# Patient Record
Sex: FEMALE | Race: ASIAN | NOT HISPANIC OR LATINO | URBAN - METROPOLITAN AREA
[De-identification: names, ages, dates, MRNs, and addresses within clinical notes are randomized per-mention and may not be internally consistent; named-entity substitution may affect disease eponyms.]

---

## 2019-07-21 ENCOUNTER — EMERGENCY (EMERGENCY)
Facility: HOSPITAL | Age: 29
LOS: 1 days | Discharge: HOME | End: 2019-07-21
Attending: EMERGENCY MEDICINE
Payer: MEDICAID

## 2019-07-21 VITALS
DIASTOLIC BLOOD PRESSURE: 63 MMHG | HEART RATE: 77 BPM | TEMPERATURE: 98 F | SYSTOLIC BLOOD PRESSURE: 101 MMHG | RESPIRATION RATE: 16 BRPM | OXYGEN SATURATION: 98 %

## 2019-07-21 VITALS
OXYGEN SATURATION: 98 % | DIASTOLIC BLOOD PRESSURE: 77 MMHG | WEIGHT: 123.9 LBS | HEART RATE: 75 BPM | TEMPERATURE: 98 F | HEIGHT: 62 IN | SYSTOLIC BLOOD PRESSURE: 112 MMHG | RESPIRATION RATE: 20 BRPM

## 2019-07-21 LAB
ALBUMIN SERPL ELPH-MCNC: 4.2 G/DL — SIGNIFICANT CHANGE UP (ref 3.3–5)
ALP SERPL-CCNC: 34 U/L — LOW (ref 40–120)
ALT FLD-CCNC: 11 U/L — SIGNIFICANT CHANGE UP (ref 10–45)
ANION GAP SERPL CALC-SCNC: 14 MMOL/L — SIGNIFICANT CHANGE UP (ref 5–17)
APPEARANCE UR: CLEAR — SIGNIFICANT CHANGE UP
APTT BLD: 29.3 SEC — SIGNIFICANT CHANGE UP (ref 27.5–36.3)
AST SERPL-CCNC: 15 U/L — SIGNIFICANT CHANGE UP (ref 10–40)
BACTERIA # UR AUTO: NEGATIVE — SIGNIFICANT CHANGE UP
BASOPHILS # BLD AUTO: 0.1 K/UL — SIGNIFICANT CHANGE UP (ref 0–0.2)
BASOPHILS NFR BLD AUTO: 0.6 % — SIGNIFICANT CHANGE UP (ref 0–2)
BILIRUB SERPL-MCNC: 0.3 MG/DL — SIGNIFICANT CHANGE UP (ref 0.2–1.2)
BILIRUB UR-MCNC: NEGATIVE — SIGNIFICANT CHANGE UP
BLD GP AB SCN SERPL QL: NEGATIVE — SIGNIFICANT CHANGE UP
BUN SERPL-MCNC: 12 MG/DL — SIGNIFICANT CHANGE UP (ref 7–23)
CALCIUM SERPL-MCNC: 9.4 MG/DL — SIGNIFICANT CHANGE UP (ref 8.4–10.5)
CHLORIDE SERPL-SCNC: 101 MMOL/L — SIGNIFICANT CHANGE UP (ref 96–108)
CO2 SERPL-SCNC: 21 MMOL/L — LOW (ref 22–31)
COLOR SPEC: SIGNIFICANT CHANGE UP
CREAT SERPL-MCNC: 0.62 MG/DL — SIGNIFICANT CHANGE UP (ref 0.5–1.3)
DIFF PNL FLD: ABNORMAL
EOSINOPHIL # BLD AUTO: 0.4 K/UL — SIGNIFICANT CHANGE UP (ref 0–0.5)
EOSINOPHIL NFR BLD AUTO: 4.8 % — SIGNIFICANT CHANGE UP (ref 0–6)
EPI CELLS # UR: 1 /HPF — SIGNIFICANT CHANGE UP
GLUCOSE SERPL-MCNC: 94 MG/DL — SIGNIFICANT CHANGE UP (ref 70–99)
GLUCOSE UR QL: NEGATIVE — SIGNIFICANT CHANGE UP
HCG SERPL-ACNC: HIGH MIU/ML
HCT VFR BLD CALC: 34.2 % — LOW (ref 34.5–45)
HGB BLD-MCNC: 12.3 G/DL — SIGNIFICANT CHANGE UP (ref 11.5–15.5)
HYALINE CASTS # UR AUTO: 0 /LPF — SIGNIFICANT CHANGE UP (ref 0–2)
INR BLD: 0.9 RATIO — SIGNIFICANT CHANGE UP (ref 0.88–1.16)
KETONES UR-MCNC: NEGATIVE — SIGNIFICANT CHANGE UP
LEUKOCYTE ESTERASE UR-ACNC: ABNORMAL
LYMPHOCYTES # BLD AUTO: 2.2 K/UL — SIGNIFICANT CHANGE UP (ref 1–3.3)
LYMPHOCYTES # BLD AUTO: 26.4 % — SIGNIFICANT CHANGE UP (ref 13–44)
MCHC RBC-ENTMCNC: 32.8 PG — SIGNIFICANT CHANGE UP (ref 27–34)
MCHC RBC-ENTMCNC: 36.1 GM/DL — HIGH (ref 32–36)
MCV RBC AUTO: 90.7 FL — SIGNIFICANT CHANGE UP (ref 80–100)
MONOCYTES # BLD AUTO: 0.5 K/UL — SIGNIFICANT CHANGE UP (ref 0–0.9)
MONOCYTES NFR BLD AUTO: 6.5 % — SIGNIFICANT CHANGE UP (ref 2–14)
NEUTROPHILS # BLD AUTO: 5 K/UL — SIGNIFICANT CHANGE UP (ref 1.8–7.4)
NEUTROPHILS NFR BLD AUTO: 61.6 % — SIGNIFICANT CHANGE UP (ref 43–77)
NITRITE UR-MCNC: NEGATIVE — SIGNIFICANT CHANGE UP
PH UR: 6.5 — SIGNIFICANT CHANGE UP (ref 5–8)
PLATELET # BLD AUTO: 219 K/UL — SIGNIFICANT CHANGE UP (ref 150–400)
POTASSIUM SERPL-MCNC: 3.3 MMOL/L — LOW (ref 3.5–5.3)
POTASSIUM SERPL-SCNC: 3.3 MMOL/L — LOW (ref 3.5–5.3)
PROT SERPL-MCNC: 7.3 G/DL — SIGNIFICANT CHANGE UP (ref 6–8.3)
PROT UR-MCNC: NEGATIVE — SIGNIFICANT CHANGE UP
PROTHROM AB SERPL-ACNC: 10.3 SEC — SIGNIFICANT CHANGE UP (ref 10–12.9)
RBC # BLD: 3.77 M/UL — LOW (ref 3.8–5.2)
RBC # FLD: 11.4 % — SIGNIFICANT CHANGE UP (ref 10.3–14.5)
RBC CASTS # UR COMP ASSIST: 6 /HPF — HIGH (ref 0–4)
RH IG SCN BLD-IMP: POSITIVE — SIGNIFICANT CHANGE UP
RH IG SCN BLD-IMP: POSITIVE — SIGNIFICANT CHANGE UP
SODIUM SERPL-SCNC: 136 MMOL/L — SIGNIFICANT CHANGE UP (ref 135–145)
SP GR SPEC: 1.01 — SIGNIFICANT CHANGE UP (ref 1.01–1.02)
UROBILINOGEN FLD QL: NEGATIVE — SIGNIFICANT CHANGE UP
WBC # BLD: 8.2 K/UL — SIGNIFICANT CHANGE UP (ref 3.8–10.5)
WBC # FLD AUTO: 8.2 K/UL — SIGNIFICANT CHANGE UP (ref 3.8–10.5)
WBC UR QL: 5 /HPF — SIGNIFICANT CHANGE UP (ref 0–5)

## 2019-07-21 PROCEDURE — 76815 OB US LIMITED FETUS(S): CPT | Mod: 26

## 2019-07-21 PROCEDURE — 85027 COMPLETE CBC AUTOMATED: CPT

## 2019-07-21 PROCEDURE — 81001 URINALYSIS AUTO W/SCOPE: CPT

## 2019-07-21 PROCEDURE — 80053 COMPREHEN METABOLIC PANEL: CPT

## 2019-07-21 PROCEDURE — 86850 RBC ANTIBODY SCREEN: CPT

## 2019-07-21 PROCEDURE — 99284 EMERGENCY DEPT VISIT MOD MDM: CPT

## 2019-07-21 PROCEDURE — 86900 BLOOD TYPING SEROLOGIC ABO: CPT

## 2019-07-21 PROCEDURE — 85730 THROMBOPLASTIN TIME PARTIAL: CPT

## 2019-07-21 PROCEDURE — 84702 CHORIONIC GONADOTROPIN TEST: CPT

## 2019-07-21 PROCEDURE — 76815 OB US LIMITED FETUS(S): CPT

## 2019-07-21 PROCEDURE — 86901 BLOOD TYPING SEROLOGIC RH(D): CPT

## 2019-07-21 PROCEDURE — 85610 PROTHROMBIN TIME: CPT

## 2019-07-21 PROCEDURE — 87086 URINE CULTURE/COLONY COUNT: CPT

## 2019-07-21 RX ORDER — SODIUM CHLORIDE 9 MG/ML
1000 INJECTION INTRAMUSCULAR; INTRAVENOUS; SUBCUTANEOUS ONCE
Refills: 0 | Status: COMPLETED | OUTPATIENT
Start: 2019-07-21 | End: 2019-07-21

## 2019-07-21 RX ADMIN — SODIUM CHLORIDE 1000 MILLILITER(S): 9 INJECTION INTRAMUSCULAR; INTRAVENOUS; SUBCUTANEOUS at 04:13

## 2019-07-21 NOTE — ED PROVIDER NOTE - NSFOLLOWUPINSTRUCTIONS_ED_ALL_ED_FT
You had vaginal bleeding which brought you into the ED. Your labwork was normal and your ultrasound imaging showed a small subchorionic hemorrhage, but a viable pregnancy. You should follow up with your OB/GYN Dr. Mcclain within the next 2-3 days for follow up and a check up. Please return to the ED immediately if you develop worsening pain; or if the bleeding should worsen.

## 2019-07-21 NOTE — ED ADULT NURSE NOTE - CHPI ED NUR SYMPTOMS NEG
no abdominal pain/no coffee grounds emesis/no discharge/no vaginal discharge/no vomiting/no fever/no back pain/no nausea/no pain

## 2019-07-21 NOTE — CONSULT NOTE ADULT - SUBJECTIVE AND OBJECTIVE BOX
R2 GYN ED CONSULT NOTE    HPI:   28yo GP     Pt denies fever, chills, nausea, vomiting, diarrhea, headache, constipation, dizzyness, syncope, chest pain, palpitations, shortness of breath, dysuria, urgency, frequency, abdominal/pelvic pain, vaginal bleeding/discharge/odor/pain/itching, breast lumps/bumps, dyspareunia.    In ED today    Primary OB/GYN:  OBH:  GYNH: denies hx of fibroids, ov cysts, abnl paps, sti  PMSH:  MEDS: none  ALL: nkda  SOCH: denies t/e/d; safe at home  FAMH: denies fam hx of breast/uterine/ovarian/colon cancer  ROS: negative except per hpi    OBJECTIVE:    VITAL SIGNS:  Vital Signs Last 24 Hrs  T(C): 36.7 (2019 01:36), Max: 36.7 (2019 01:36)  T(F): 98.1 (:36), Max: 98.1 (2019 01:36)  HR: 75 (:36) (75 - 75)  BP: 112/77 (2019 01:36) (112/77 - 112/77)  BP(mean): --  RR: 20 (:36) (20 - 20)  SpO2: 98% (:36) (98% - 98%)  Height (cm): 157.48 (19 @ 01:36)  Weight (kg): 56.2 (19 @ 01:36)  BMI (kg/m2): 22.7 (19 @ 01:36)  BSA (m2): 1.56 (19 @ 01:36)  CAPILLARY BLOOD GLUCOSE            PHYSICAL EXAM:  GEN: NAD, A&Ox3  CV: RRR, no m/g/r  LUNGS: CTAB  ABD: soft, NT, ND, +BS  SPECULUM:  PELVIC:  EXT: WWP, no edema/TTP    LABS:                        12.3   8.2   )-----------( 219      ( 2019 03:58 )             34.2   baso 0.6    eos 4.8    imm gran x      lymph 26.4   mono 6.5    poly 61.6           136  |  101  |  12  ----------------------------<  94  3.3<L>   |  21<L>  |  0.62    Ca    9.4      2019 03:58    TPro  7.3  /  Alb  4.2  /  TBili  0.3  /  DBili  x   /  AST  15  /  ALT  11  /  AlkPhos  34<L>  07-21      Urinalysis Basic - ( 2019 04:12 )    Color: Light Yellow / Appearance: Clear / S.010 / pH: x  Gluc: x / Ketone: Negative  / Bili: Negative / Urobili: Negative   Blood: x / Protein: Negative / Nitrite: Negative   Leuk Esterase: Moderate / RBC: 6 /hpf / WBC 5 /HPF   Sq Epi: x / Non Sq Epi: 1 /hpf / Bacteria: Negative        RADIOLOGY:    ASSESSMENT:    RECOMMENDATIONS: R2 GYN ED CONSULT NOTE    HPI:   28yo  @ 10+4wks GA (LMP 19) presents c/o increased bleeding this evening. PNC c/b a cervical polyp diagnosed 19, and patient has been spotting since. At 1am, patient saw blood in the toilet after urination and said she continued to bleed like a period thereafter. Has used one pad since then, not soaked. Has been having mild abdominal cramping for the last few days, including this evening, but currently denies having them. Denies fever, chills, nausea, vomiting, dizziness, syncope, chest pain, palpitations, shortness of breath, dysuria.     In ED today the patient received 1L IVF, no analgesics.     Primary OB/GYN: Dr. Mcclain     OBH: current; c/b cervical polyp  GYNH: normal monthly periods, denies hx of fibroids, ov cysts, abnl paps, sti  PMSH: denies  MEDS: none  ALL: nkda  SOCH: denies t/e/d; safe at home  FAMH: denies fam hx of breast/uterine/ovarian/colon cancer    ROS: negative except per hpi    OBJECTIVE:    VITAL SIGNS:  Vital Signs Last 24 Hrs  T(C): 36.7 (2019 01:36), Max: 36.7 (2019 01:36)  T(F): 98.1 (2019 01:36), Max: 98.1 (2019 01:36)  HR: 75 (2019 01:36) (75 - 75)  BP: 112/77 (2019 01:36) (112/77 - 112/77)  BP(mean): --  RR: 20 (2019 01:36) (20 - 20)  SpO2: 98% (2019 01:36) (98% - 98%)  Height (cm): 157.48 (19 @ 01:36)  Weight (kg): 56.2 (19 @ 01:36)  BMI (kg/m2): 22.7 (19 @ 01:36)  BSA (m2): 1.56 (19 @ 01:36)  CAPILLARY BLOOD GLUCOSE      PHYSICAL EXAM:  GEN: NAD, A&Ox3  CV: RRR, no m/g/r  LUNGS: CTAB  ABD: soft, NT, ND, +BS  SPECULUM: small amount of blood, no active bleeding, cervical polyp visualized  PELVIC: closed cervix, no CMT, firm fundus, no adnexal or uterine tenderness  EXT: no edema/TTP    LABS:                        12.3   8.2   )-----------( 219      ( 2019 03:58 )             34.2   baso 0.6    eos 4.8    imm gran x      lymph 26.4   mono 6.5    poly 61.6           136  |  101  |  12  ----------------------------<  94  3.3<L>   |  21<L>  |  0.62    Ca    9.4      2019 03:58    TPro  7.3  /  Alb  4.2  /  TBili  0.3  /  DBili  x   /  AST  15  /  ALT  11  /  AlkPhos  34<L>        Urinalysis Basic - ( 2019 04:12 )    Color: Light Yellow / Appearance: Clear / S.010 / pH: x  Gluc: x / Ketone: Negative  / Bili: Negative / Urobili: Negative   Blood: x / Protein: Negative / Nitrite: Negative   Leuk Esterase: Moderate / RBC: 6 /hpf / WBC 5 /HPF   Sq Epi: x / Non Sq Epi: 1 /hpf / Bacteria: Negative        RADIOLOGY:  Pending

## 2019-07-21 NOTE — ED PROVIDER NOTE - PHYSICAL EXAMINATION
PHYSICAL EXAM:  GENERAL: NAD, well-groomed, well-developed  HEAD:  Atraumatic, Normocephalic  EYES: EOMI, PERRLA, conjunctiva and sclera clear  ENMT: No tonsillar erythema, exudates, or enlargement; Moist mucous membranes  NECK: Supple, No JVD  HEART: Regular rate and rhythm; No murmurs, rubs, or gallops  RESPIRATORY: CTA B/L, No W/R/R  ABDOMEN: Soft, Nontender, Nondistended, PELVIC: cerivcal os open, 2-3 cm, minimal bleeding, no CMT, no adnexal tenderness, chaperone B Isiah RN  NEURO: A&Ox3, nonfocal, moving all extremities  EXTREMITIES:  2+ Peripheral Pulses, No clubbing, cyanosis, or edema  SKIN: warm, dry, normal color, no rash

## 2019-07-21 NOTE — ED PROVIDER NOTE - ATTENDING CONTRIBUTION TO CARE
pt is a 30 y/o female about 10 weeks pregnant with vag spotting, saw her ob thought to be due to a cervical polyp but bleeding heaving tonight no clots. had some cramping, vss, abd soft, nt, tv us, labs ordered, told prior us with iup, threatened miscarriage work up.

## 2019-07-21 NOTE — ED PROVIDER NOTE - PROGRESS NOTE DETAILS
Stacy PGY2- OB to see in ED, will examine and review vee Aidan PGY-2 - Pt signed out to me. No longer bleeding. Ultrasound looks reassuring. OB saw the patient and stated she is ok to be discharged with follow up.

## 2019-07-21 NOTE — ED PROVIDER NOTE - CLINICAL SUMMARY MEDICAL DECISION MAKING FREE TEXT BOX
See Attending Note See Attending Note,  Stacy PGY2- 1st trimester bleeding, no AC, no sx, G1, has OB f/u,   labs, sono, type, urine, fluids, OB consult

## 2019-07-21 NOTE — CONSULT NOTE ADULT - ASSESSMENT
28yo  @ 10+4wks GA, PNC c/b cervical polyp, presents c/o increased VB. Physical exam with no active bleeding. CBC wnl. Rh+.     -Recs to follow once pelvic sonogram results    Mary Caban PGY-2

## 2019-07-21 NOTE — ED ADULT NURSE REASSESSMENT NOTE - NS ED NURSE REASSESS COMMENT FT1
Received report from nurse Aby Joyce. Pt resting in bed.  No acute respiratory distress noted, v/s obtained.

## 2019-07-21 NOTE — ED ADULT NURSE NOTE - OBJECTIVE STATEMENT
29 YOF A&Ox3 with no pmh presents to ED for vaginal bleeding of 1 day. Patient states she is 10 weeks pregnant (first pregnancy) and noticed some bright red blood when she went to the bathroom earlier. Patient stated had some mild pain earlier but currently has no pain. Patient denies sob, chest pain, n/v/d, headaches & blurry vision. patient appears to be in no acute distress. safety maintained.

## 2019-07-21 NOTE — ED PROVIDER NOTE - OBJECTIVE STATEMENT
29 yoF, no PMHx, G1, 10 wks preg, LNMP early May presents to ED with VB for a few hours this am. Has had VB over past 1 month and told ?cerivical polyp. Told os open at visit on 7/5 with OB Dr. Grier. No AC meds. Mild cramping. No urinary sx. No fever, NVD, CP/SOB, or syncope.

## 2019-07-22 LAB
CULTURE RESULTS: SIGNIFICANT CHANGE UP
SPECIMEN SOURCE: SIGNIFICANT CHANGE UP

## 2019-09-26 ENCOUNTER — TRANSCRIPTION ENCOUNTER (OUTPATIENT)
Age: 29
End: 2019-09-26

## 2019-09-26 ENCOUNTER — INPATIENT (INPATIENT)
Facility: HOSPITAL | Age: 29
LOS: 0 days | Discharge: HOME | DRG: 832 | End: 2019-09-27
Attending: OBSTETRICS & GYNECOLOGY | Admitting: OBSTETRICS & GYNECOLOGY
Payer: MEDICAID

## 2019-09-26 VITALS — WEIGHT: 119.05 LBS | HEIGHT: 62 IN

## 2019-09-26 DIAGNOSIS — O26.899 OTHER SPECIFIED PREGNANCY RELATED CONDITIONS, UNSPECIFIED TRIMESTER: ICD-10-CM

## 2019-09-26 DIAGNOSIS — Z3A.00 WEEKS OF GESTATION OF PREGNANCY NOT SPECIFIED: ICD-10-CM

## 2019-09-26 DIAGNOSIS — Z34.80 ENCOUNTER FOR SUPERVISION OF OTHER NORMAL PREGNANCY, UNSPECIFIED TRIMESTER: ICD-10-CM

## 2019-09-26 PROBLEM — Z00.00 ENCOUNTER FOR PREVENTIVE HEALTH EXAMINATION: Status: ACTIVE | Noted: 2019-09-26

## 2019-09-26 LAB
ALBUMIN SERPL ELPH-MCNC: 4 G/DL — SIGNIFICANT CHANGE UP (ref 3.3–5)
ALP SERPL-CCNC: 43 U/L — SIGNIFICANT CHANGE UP (ref 40–120)
ALT FLD-CCNC: 46 U/L — HIGH (ref 10–45)
ANION GAP SERPL CALC-SCNC: 11 MMOL/L — SIGNIFICANT CHANGE UP (ref 5–17)
APPEARANCE UR: ABNORMAL
APTT BLD: 27.9 SEC — SIGNIFICANT CHANGE UP (ref 27.5–36.3)
AST SERPL-CCNC: 32 U/L — SIGNIFICANT CHANGE UP (ref 10–40)
BACTERIA # UR AUTO: NEGATIVE — SIGNIFICANT CHANGE UP
BILIRUB SERPL-MCNC: 0.2 MG/DL — SIGNIFICANT CHANGE UP (ref 0.2–1.2)
BILIRUB UR-MCNC: NEGATIVE — SIGNIFICANT CHANGE UP
BLD GP AB SCN SERPL QL: NEGATIVE — SIGNIFICANT CHANGE UP
BUN SERPL-MCNC: 12 MG/DL — SIGNIFICANT CHANGE UP (ref 7–23)
CALCIUM SERPL-MCNC: 9.5 MG/DL — SIGNIFICANT CHANGE UP (ref 8.4–10.5)
CHLORIDE SERPL-SCNC: 104 MMOL/L — SIGNIFICANT CHANGE UP (ref 96–108)
CO2 SERPL-SCNC: 21 MMOL/L — LOW (ref 22–31)
COLOR SPEC: YELLOW — SIGNIFICANT CHANGE UP
CREAT SERPL-MCNC: 0.56 MG/DL — SIGNIFICANT CHANGE UP (ref 0.5–1.3)
DIFF PNL FLD: NEGATIVE — SIGNIFICANT CHANGE UP
EPI CELLS # UR: 5 — SIGNIFICANT CHANGE UP
GLUCOSE SERPL-MCNC: 89 MG/DL — SIGNIFICANT CHANGE UP (ref 70–99)
GLUCOSE UR QL: NEGATIVE — SIGNIFICANT CHANGE UP
HCT VFR BLD CALC: 32.5 % — LOW (ref 34.5–45)
HGB BLD-MCNC: 11.4 G/DL — LOW (ref 11.5–15.5)
HYALINE CASTS # UR AUTO: 2 /LPF — SIGNIFICANT CHANGE UP (ref 0–7)
INR BLD: 0.9 RATIO — SIGNIFICANT CHANGE UP (ref 0.88–1.16)
KETONES UR-MCNC: NEGATIVE — SIGNIFICANT CHANGE UP
LEUKOCYTE ESTERASE UR-ACNC: ABNORMAL
MCHC RBC-ENTMCNC: 32.1 PG — SIGNIFICANT CHANGE UP (ref 27–34)
MCHC RBC-ENTMCNC: 35.1 GM/DL — SIGNIFICANT CHANGE UP (ref 32–36)
MCV RBC AUTO: 91.5 FL — SIGNIFICANT CHANGE UP (ref 80–100)
NITRITE UR-MCNC: NEGATIVE — SIGNIFICANT CHANGE UP
PH UR: 6.5 — SIGNIFICANT CHANGE UP (ref 5–8)
PLATELET # BLD AUTO: 231 K/UL — SIGNIFICANT CHANGE UP (ref 150–400)
POTASSIUM SERPL-MCNC: 3.6 MMOL/L — SIGNIFICANT CHANGE UP (ref 3.5–5.3)
POTASSIUM SERPL-SCNC: 3.6 MMOL/L — SIGNIFICANT CHANGE UP (ref 3.5–5.3)
PROT SERPL-MCNC: 7.1 G/DL — SIGNIFICANT CHANGE UP (ref 6–8.3)
PROT UR-MCNC: ABNORMAL
PROTHROM AB SERPL-ACNC: 10.3 SEC — SIGNIFICANT CHANGE UP (ref 10–12.9)
RBC # BLD: 3.55 M/UL — LOW (ref 3.8–5.2)
RBC # FLD: 12.1 % — SIGNIFICANT CHANGE UP (ref 10.3–14.5)
RBC CASTS # UR COMP ASSIST: 7 /HPF — HIGH (ref 0–4)
RH IG SCN BLD-IMP: POSITIVE — SIGNIFICANT CHANGE UP
SODIUM SERPL-SCNC: 136 MMOL/L — SIGNIFICANT CHANGE UP (ref 135–145)
SP GR SPEC: 1.03 — HIGH (ref 1.01–1.02)
UROBILINOGEN FLD QL: NEGATIVE — SIGNIFICANT CHANGE UP
WBC # BLD: 11 K/UL — HIGH (ref 3.8–10.5)
WBC # FLD AUTO: 11 K/UL — HIGH (ref 3.8–10.5)
WBC UR QL: 4 /HPF — SIGNIFICANT CHANGE UP (ref 0–5)

## 2019-09-26 RX ORDER — IBUPROFEN 200 MG
600 TABLET ORAL EVERY 6 HOURS
Refills: 0 | Status: DISCONTINUED | OUTPATIENT
Start: 2019-09-26 | End: 2019-09-27

## 2019-09-26 RX ORDER — LIDOCAINE HCL 20 MG/ML
20 VIAL (ML) INJECTION ONCE
Refills: 0 | Status: DISCONTINUED | OUTPATIENT
Start: 2019-09-26 | End: 2019-09-27

## 2019-09-26 RX ORDER — MIFEPRISTONE 300 MG/1
200 TABLET, FILM COATED ORAL ONCE
Refills: 0 | Status: COMPLETED | OUTPATIENT
Start: 2019-09-26 | End: 2019-09-26

## 2019-09-26 RX ORDER — SODIUM CHLORIDE 9 MG/ML
1000 INJECTION, SOLUTION INTRAVENOUS
Refills: 0 | Status: DISCONTINUED | OUTPATIENT
Start: 2019-09-27 | End: 2019-09-27

## 2019-09-26 RX ORDER — IBUPROFEN 200 MG
1 TABLET ORAL
Qty: 0 | Refills: 0 | DISCHARGE
Start: 2019-09-26

## 2019-09-26 RX ORDER — ACETAMINOPHEN 500 MG
3 TABLET ORAL
Qty: 0 | Refills: 0 | DISCHARGE
Start: 2019-09-26

## 2019-09-26 RX ORDER — ACETAMINOPHEN 500 MG
975 TABLET ORAL EVERY 6 HOURS
Refills: 0 | Status: DISCONTINUED | OUTPATIENT
Start: 2019-09-26 | End: 2019-09-27

## 2019-09-26 RX ORDER — AZITHROMYCIN 500 MG/1
1000 TABLET, FILM COATED ORAL ONCE
Refills: 0 | Status: DISCONTINUED | OUTPATIENT
Start: 2019-09-27 | End: 2019-09-27

## 2019-09-26 RX ORDER — OXYCODONE HYDROCHLORIDE 5 MG/1
5 TABLET ORAL EVERY 4 HOURS
Refills: 0 | Status: DISCONTINUED | OUTPATIENT
Start: 2019-09-26 | End: 2019-09-27

## 2019-09-26 RX ADMIN — MIFEPRISTONE 200 MILLIGRAM(S): 300 TABLET, FILM COATED ORAL at 20:45

## 2019-09-26 NOTE — CHART NOTE - NSCHARTNOTEFT_GEN_A_CORE
Family Planning Consult Note      BILL LEI is a 29y  at 20w1d with confirmed PPROM x 2d. Family planning consulted for termination of pregnancy.    PNC: short cervix, on vaginal progesterone nightly that started 1 month ago. Cervical polyp that caused bleeding during the pregnancy.  PMH: Denies.  PSH: denies  Meds: PNVs  Allergies: Denies.  FHx: Asthma, Diabetes in grandmother.   POb:   Pgyn: regular periods, with intermenstrual bleeding. Denies fibroids, cysts, STDs, abnormal paps.   Social: No T/E/D. 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     PE    D+E Counseling    Options for the pregnancy were discussed with the patient, including continuation of pregnancy, labor induction, and dilation and evacuation (D&E). Given the extremely poor prognosis for fetal outcome and increased risk to maternal health or life from prolonged rupture of membranes, the patient would prefer not to continue the pregnancy. She does not desire a labor induction and is requesting a D&E. Patient aware specimen does not come out intact.    Risks of D&E including infection; risk of hemorrhage, possibly requiring blood transfusion; and risk of injury to vagina, cervix or uterus, possibly requiring laparoscopy, laparotomy or hysterectomy were reviewed in detail. The patient was counseled on the small risk of uterine perforation and risk of injury to rectum, bowel, bladder, ureters, pelvic nerves and blood vessels. The patient was also counseled on the small risk of the need for further surgery and of the risk, as with any surgical procedure, of death.    The risk of harm to subsequent pregnancies or the ability to carry a subsequent child to term, and adverse psychological effects was discussed.    Need for cervical ripening with misoprostol, mifepristone, pre-operative laminaria placement, and administration of intra-amniotic or intra-fetal KCl or digoxin were also discussed; the accompanying risks of infection, bleeding, injury, rupture of membranes, and  labor were reviewed. The risks of delivery of a severely premature infant were reviewed. She understands these risks and agrees to above. The patient [does] does not have any underlying medical conditions that would increase her risks associated with the procedure. [These conditions are] [The additional risks of the procedure are]    The patient also understands it is her responsibility to bring to the attention of her physician any unusual symptoms following the  and to report to follow-up examinations.    [place] regulations were reviewed and since the pregnancy is greater than 20 weeks it is the patient’s responsibility to dispose of the remains following the procedure.    She is sure of her decision and denies any coercion from family, friends or healthcare providers. The patient had the opportunity to ask questions and all questions were answered.      Laminaria Placement    All consents reviewed and confirmed. Patient voiced understanding and confirmed that the risks of this procedure are discomfort, infection,  labor, premature  rupture of membranes, and bleeding. The patient confirms that she understands that once the cervical dilators have been placed, that the  procedure has been started. If she chooses to continue the pregnancy after the placement of the dilators, she understands that there is a risk of  premature rupture of membranes, miscarriage and giving birth to an extremely premature infant. The patient also understands that there is no guarantee that the use of dilators will be successful. She states understanding that mifepristone is considered a pregnancy-ending drug and that dilators and mifepristone are considered pregnancy-ending.    Pre-operative time out performed. Patient’s name, date of birth and procedure were confirmed.    The speculum was placed, [cervical cultures obtained] the cervix was cleansed x3 with [betadyne][hibiclens]. A single tooth tenaculum was placed on the anterior lip of the cervix. A paracervical block was placed in the standard fashion using 20 cc of 1% lidocaine. [Cervix dilated using Christensen dilators]    [ ]#4 laminaria placed    [ ] Dilapan placed    2 Sponges placed    The patient tolerated the procedure well    EBL: minimum          29y  at 20w1d with confirmed PPROM x 2 days scheduled for D+E tomorrow    1. Dilation and Evacuation  -All records and ultrasounds have been reviewed  - Pt does not desire induction of labor  - reviewed patient’s responsibility to contact insurance company for coverage confirmation  -All consents signed today, all questions/concerns addressed  - Patient offered pamphlet for support services [patient accepted][patient declined]  - Patient offered fetal footprints [patient accepted][patient declined]  - Genetics [patient requesting][patient declining][N/A][need to be precertified]  - Reviewed disposal of remains, hospital vs. private burial. Patient desires [routine hospital disposal][private  home burial]    2. Surgery scheduling  - Patient to be precertified for D+E  - D+E scheduled for 7:30a in main operating room    3. ID/Cervical prep  - GC/CT obtained  - rx misoprostol 400 mcg to be placed buccally 3 hours pre op  - pt [desires][declines] HIV/RPR/hepatitis  - Azithromycin dose pending results  - Prescription for Azithromycin [500mg] [1g] given due to [negative][unknown] history of gonorrhea/chlamydia  - Prescription for narcotic [ ] # [ ] with no refills given  - HCS authorization #: ____________  - Reviewed Ibuprofen 600 mg po q 6 hours    4. Labs/Blood type  - to get CBC, T+S, coags at PST’s  - Rhogam pending results    5. Contraception  - Patient counseled on all contraceptive options  - Patient [desires][does not desire] post termination contraception  -[will place IUD at the completion of the procedure]  -[will place Subdermal implant at the completion of the procedure]  -[Patient desires pregnancy]    6. Post-op  - Post-operative follow-up visit to be scheduled in 2 weeks  - Post-operative instruction sheet given, reviewed bleeding and infection precautions  - Provided 24 hour contact phone number  - All questions/concerns addressed Family Planning Consult Note      BILL LEI is a 29y  at 20w1d with confirmed PPROM x 2d. Family planning consulted for termination of pregnancy.    PNC: short cervix, on vaginal progesterone nightly that started 1 month ago. Cervical polyp that caused bleeding during the pregnancy.  PMH: Denies.  PSH: denies  Meds: PNVs  Allergies: Denies.  FHx: Asthma, Diabetes in grandmother.   POb:   Pgyn: regular periods, with intermenstrual bleeding. Denies fibroids, cysts, STDs, abnormal paps.   Social: No T/E/D. 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     PE  General alert, oriented x 3. NAD  SVE     D+E Counseling    Options for the pregnancy were discussed with the patient, including continuation of pregnancy, labor induction, and dilation and evacuation (D&E). Given the extremely poor prognosis for fetal outcome and increased risk to maternal health or life from prolonged rupture of membranes, the patient would prefer not to continue the pregnancy. She does not desire a labor induction and is requesting a D&E. Patient aware specimen does not come out intact.    Risks of D&E including infection; risk of hemorrhage, possibly requiring blood transfusion; and risk of injury to vagina, cervix or uterus, possibly requiring laparoscopy, laparotomy or hysterectomy were reviewed in detail. The patient was counseled on the small risk of uterine perforation and risk of injury to rectum, bowel, bladder, ureters, pelvic nerves and blood vessels. The patient was also counseled on the small risk of the need for further surgery and of the risk, as with any surgical procedure, of death.    The risk of harm to subsequent pregnancies or the ability to carry a subsequent child to term, and adverse psychological effects was discussed.    Need for cervical ripening with misoprostol, mifepristone, pre-operative laminaria placement, and administration of intra-amniotic or intra-fetal KCl or digoxin were also discussed; the accompanying risks of infection, bleeding, injury, rupture of membranes, and  labor were reviewed. The risks of delivery of a severely premature infant were reviewed. She understands these risks and agrees to above. The patient does not have any underlying medical conditions that would increase her risks associated with the procedure.     The patient also understands it is her responsibility to bring to the attention of her physician any unusual symptoms following the  and to report to follow-up examinations.    [place] regulations were reviewed and since the pregnancy is greater than 20 weeks it is the patient’s responsibility to dispose of the remains following the procedure.    She is sure of her decision and denies any coercion from family, friends or healthcare providers. The patient had the opportunity to ask questions and all questions were answered.                          11.4   11.0  )-----------( 231      ( 26 Sep 2019 15:25 )             32.5     PT/INR - ( 26 Sep 2019 15:25 )   PT: 10.3 sec;   INR: 0.90 ratio         PTT - ( 26 Sep 2019 15:25 )  PTT:27.9 sec    Type + Screen (19 @ 15:52)    ABO Interpretation: O    Rh Interpretation: Positive    Antibody Screen: Negative          29y  at 20w1d with confirmed PPROM x 2 days scheduled for D+E tomorrow    1. Dilation and Evacuation  - All records and ultrasounds have been reviewed  - Pt does not desire induction of labor  - reviewed patient’s responsibility to contact insurance company for coverage confirmation  - All consents signed today, all questions/concerns addressed  - Patient offered pamphlet for support services patient accepted  - Patient offered fetal footprints [patient accepted][patient declined]  - Genetics [patient requesting][patient declining][N/A][need to be precertified]  - Reviewed disposal of remains, private burial. Patient understands responsibility to arrange private  home burial    2. Surgery scheduling  - Patient to be precertified for D+E  - D+E scheduled for 7:30a in main operating room    3. ID/Cervical prep  - GC/CT pending  - rx misoprostol 400 mcg to be placed buccally 2 hours pre op  - pt declines HIV/RPR/hepatitis  - Azithromycin dose pending results  - Azithromycin 1g ordered due to unknown history of gonorrhea/chlamydia  - Ibuprofen/Tylenol/Oxycodone for pain    4. Labs/Blood type  - to get CBC, T+S, coags at PST[facility]  - Blood type     5. Contraception  - Patient counseled on all contraceptive options  - Patient [desires][does not desire] post termination contraception  -[will place IUD at the completion of the procedure]  -[will place Subdermal implant at the completion of the procedure]  -[Patient desires pregnancy]    6. Post-op  - Post-operative follow-up visit to be scheduled in 2 weeks  - Post-operative instruction sheet given, reviewed bleeding and infection precautions  - Provided 24 hour contact phone number  - All questions/concerns addressed Family Planning Consult Note    [employer]  #[id number] (Eber    BILL LEI is a 29y  at 20w1d with confirmed PPROM x 2d. Family planning consulted for termination of pregnancy.    PNC: short cervix, on vaginal progesterone nightly that started 1 month ago. Cervical polyp that caused bleeding during the pregnancy.  PMH: Denies.  PSH: denies  Meds: PNVs  Allergies: Denies.  FHx: Asthma, Diabetes in grandmother.   POb:   Pgyn: regular periods, with intermenstrual bleeding. Denies fibroids, cysts, STDs, abnormal paps.   Social: No T/E/D. 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     PE  General alert, oriented x 3. NAD  SVE     D+E Counseling    Options for the pregnancy were discussed with the patient, including continuation of pregnancy, labor induction, and dilation and evacuation (D&E). Given the extremely poor prognosis for fetal outcome and increased risk to maternal health or life from prolonged rupture of membranes, the patient would prefer not to continue the pregnancy. She does not desire a labor induction and is requesting a D&E. Patient aware specimen does not come out intact.    Risks of D&E including infection; risk of hemorrhage, possibly requiring blood transfusion; and risk of injury to vagina, cervix or uterus, possibly requiring laparoscopy, laparotomy or hysterectomy were reviewed in detail. The patient was counseled on the small risk of uterine perforation and risk of injury to rectum, bowel, bladder, ureters, pelvic nerves and blood vessels. The patient was also counseled on the small risk of the need for further surgery and of the risk, as with any surgical procedure, of death.    The risk of harm to subsequent pregnancies or the ability to carry a subsequent child to term, and adverse psychological effects was discussed.    Need for cervical ripening with misoprostol, mifepristone, pre-operative laminaria placement, and administration of intra-amniotic or intra-fetal KCl or digoxin were also discussed; the accompanying risks of infection, bleeding, injury, rupture of membranes, and  labor were reviewed. The risks of delivery of a severely premature infant were reviewed. She understands these risks and agrees to above. The patient does not have any underlying medical conditions that would increase her risks associated with the procedure.     The patient also understands it is her responsibility to bring to the attention of her physician any unusual symptoms following the  and to report to follow-up examinations.    [place] regulations were reviewed and since the pregnancy is greater than 20 weeks it is the patient’s responsibility to dispose of the remains following the procedure.    She is sure of her decision and denies any coercion from family, friends or healthcare providers. The patient had the opportunity to ask questions and all questions were answered.                          11.4   11.0  )-----------( 231      ( 26 Sep 2019 15:25 )             32.5     PT/INR - ( 26 Sep 2019 15:25 )   PT: 10.3 sec;   INR: 0.90 ratio         PTT - ( 26 Sep 2019 15:25 )  PTT:27.9 sec    Type + Screen (19 @ 15:52)    ABO Interpretation: O    Rh Interpretation: Positive    Antibody Screen: Negative          29y  at 20w1d with confirmed PPROM x 2 days scheduled for D+E tomorrow    1. Dilation and Evacuation  - All records and ultrasounds have been reviewed  - Pt does not desire induction of labor  - reviewed patient’s responsibility to contact insurance company for coverage confirmation  - All consents signed today, all questions/concerns addressed  - Patient offered pamphlet for support services patient accepted; social work consult placed.  - Patient offered fetal footprints [patient accepted][patient declined]  - Genetics [patient requesting][patient declining][N/A][need to be precertified]  - Reviewed disposal of remains, private burial. Patient understands responsibility to arrange private  home burial    2. Surgery scheduling  - Patient to be precertified for D+E  - D+E scheduled for 7:30a in main operating room    3. ID/Cervical prep  - GC/CT pending  - Mifepristone given today, Lot#[id number]  - rx misoprostol 400 mcg to be placed buccally 2 hours pre op  - pt declines HIV/RPR/hepatitis  - Azithromycin dose pending results  - Azithromycin 1g ordered due to unknown history of gonorrhea/chlamydia  - Ibuprofen/Tylenol/Oxycodone for pain    4. Labs/Blood type  - to get CBC, T+S, coags at PST[facility]  - Blood type     5. Contraception  - Patient counseled on all contraceptive options  - Patient [desires][does not desire] post termination contraception  -[will place IUD at the completion of the procedure]  -[will place Subdermal implant at the completion of the procedure]  -[Patient desires pregnancy]    6. Post-op  - Post-operative follow-up visit to be scheduled in 2 weeks  - Post-operative instruction sheet given, reviewed bleeding and infection precautions  - Provided 24 hour contact phone number  - All questions/concerns addressed Family Planning Consult Note    [employer]  #[id number] (Gm)    BILL LEI is a 29y  at 20w1d with confirmed PPROM x 2d. Family planning consulted for termination of pregnancy.    PNC: short cervix, on vaginal progesterone nightly that started 1 month ago. Cervical polyp that caused bleeding during the pregnancy.  PMH: Denies.  PSH: denies  Meds: PNVs  Allergies: Denies.  FHx: Asthma, Diabetes in grandmother.   POb:   Pgyn: regular periods, with intermenstrual bleeding. Denies fibroids, cysts, STDs, abnormal paps.   Social: No T/E/D. 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     PE  General alert, oriented x 3. NAD  SVE /-3    D+E Counseling    Options for the pregnancy were discussed with the patient, including continuation of pregnancy, labor induction, and dilation and evacuation (D&E). Given the extremely poor prognosis for fetal outcome and increased risk to maternal health or life from prolonged rupture of membranes, the patient would prefer not to continue the pregnancy. She does not desire a labor induction and is requesting a D&E. Patient aware specimen does not come out intact.    Risks of D&E including infection; risk of hemorrhage, possibly requiring blood transfusion; and risk of injury to vagina, cervix or uterus, possibly requiring laparoscopy, laparotomy or hysterectomy were reviewed in detail. The patient was counseled on the small risk of uterine perforation and risk of injury to rectum, bowel, bladder, ureters, pelvic nerves and blood vessels. The patient was also counseled on the small risk of the need for further surgery and of the risk, as with any surgical procedure, of death.    The risk of harm to subsequent pregnancies or the ability to carry a subsequent child to term, and adverse psychological effects was discussed.    Need for cervical ripening with misoprostol, mifepristone, pre-operative laminaria placement, and administration of intra-amniotic or intra-fetal KCl or digoxin were also discussed; the accompanying risks of infection, bleeding, injury, rupture of membranes, and  labor were reviewed. The risks of delivery of a severely premature infant were reviewed. She understands these risks and agrees to above. The patient does not have any underlying medical conditions that would increase her risks associated with the procedure.     The patient also understands it is her responsibility to bring to the attention of her physician any unusual symptoms following the  and to report to follow-up examinations.    [place] regulations were reviewed and since the pregnancy is greater than 20 weeks it is the patient’s responsibility to dispose of the remains following the procedure.    She is sure of her decision and denies any coercion from family, friends or healthcare providers. The patient had the opportunity to ask questions and all questions were answered.                          11.4   11.0  )-----------( 231      ( 26 Sep 2019 15:25 )             32.5     PT/INR - ( 26 Sep 2019 15:25 )   PT: 10.3 sec;   INR: 0.90 ratio         PTT - ( 26 Sep 2019 15:25 )  PTT:27.9 sec    Type + Screen (19 @ 15:52)    ABO Interpretation: O    Rh Interpretation: Positive    Antibody Screen: Negative          29y  at 20w1d with confirmed PPROM x 2 days scheduled for D+E tomorrow    1. Dilation and Evacuation  - All records and ultrasounds have been reviewed  - Pt does not desire induction of labor  - reviewed patient’s responsibility to contact insurance company for coverage confirmation  - All consents signed today, all questions/concerns addressed  - Patient offered pamphlet for support services patient accepted; social work consult placed.  - Patient offered fetal footprints patient accepted  - Genetics: options, benefits, alternative, process reviewed. Patient will decide in AM  - Autopsy: patient declined full autopsy, elected for gross pathology only  - Reviewed disposal of remains, private burial. Patient understands responsibility to arrange private  home burial    2. Surgery scheduling  - Patient to be precertified for D+E  - D+E scheduled for 7:30a in main operating room    3. ID/Cervical prep  - GC/CT pending  - Mifepristone given today, Lot#[id number]  - rx misoprostol 400 mcg to be placed buccally 2h hours pre op  - pt declines HIV/RPR/hepatitis  - Azithromycin 1g ordered due to unknown history of gonorrhea/chlamydia  - Ibuprofen/Tylenol/Oxycodone for pain    4. Labs/Blood type  - CBC, T+S, Coags reviewed. No gross abnormalities  - Blood type O+, not a rhogam candidate    5. Contraception  - Patient counseled on all contraceptive options  - Patient does not desire post termination contraception    6. Post-op  - Post-operative follow-up visit to be scheduled in 2 weeks  - Post-operative instruction sheet given, reviewed bleeding and infection precautions  - Provided 24 hour contact phone number  - All questions/concerns addressed    Patient seen, evaluated, and discussed with Dr. Jael Mora PGY2 Family Planning Consult Note    [employer]  #[id number] (Gm)    BILL LEI is a 29y  at 20w1d with confirmed PPROM x 2d. Family planning consulted for termination of pregnancy.    PNC: short cervix, on vaginal progesterone nightly that started 1 month ago. Cervical polyp that caused bleeding during the pregnancy.  PMH: Denies.  PSH: denies  Meds: PNVs  Allergies: Denies.  FHx: Asthma, Diabetes in grandmother.   POb:   Pgyn: regular periods, with intermenstrual bleeding. Denies fibroids, cysts, STDs, abnormal paps.   Social: No T/E/D. 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     PE  General alert, oriented x 3. NAD  SVE /-3, soft (performed by Dr. Elder)    D+E Counseling    Options for the pregnancy were discussed with the patient, including continuation of pregnancy, labor induction, and dilation and evacuation (D&E). Given the extremely poor prognosis for fetal outcome and increased risk to maternal health or life from prolonged rupture of membranes, the patient would prefer not to continue the pregnancy. She does not desire a labor induction and is requesting a D&E. Patient aware specimen does not come out intact.    Risks of D&E including infection; risk of hemorrhage, possibly requiring blood transfusion; and risk of injury to vagina, cervix or uterus, possibly requiring laparoscopy, laparotomy or hysterectomy were reviewed in detail. The patient was counseled on the small risk of uterine perforation and risk of injury to rectum, bowel, bladder, ureters, pelvic nerves and blood vessels. The patient was also counseled on the small risk of the need for further surgery and of the risk, as with any surgical procedure, of death.    The risk of harm to subsequent pregnancies or the ability to carry a subsequent child to term, and adverse psychological effects was discussed.    Need for cervical ripening with misoprostol, mifepristone, pre-operative laminaria placement, and administration of intra-amniotic or intra-fetal KCl or digoxin were also discussed; the accompanying risks of infection, bleeding, injury, rupture of membranes, and  labor were reviewed. The risks of delivery of a severely premature infant were reviewed. She understands these risks and agrees to above. The patient does not have any underlying medical conditions that would increase her risks associated with the procedure.     The patient also understands it is her responsibility to bring to the attention of her physician any unusual symptoms following the  and to report to follow-up examinations.    [place] regulations were reviewed and since the pregnancy is greater than 20 weeks it is the patient’s responsibility to dispose of the remains following the procedure.    She is sure of her decision and denies any coercion from family, friends or healthcare providers. The patient had the opportunity to ask questions and all questions were answered.                          11.4   11.0  )-----------( 89      ( 26 Sep 2019 15:25 )             32.5     PT/INR - ( 26 Sep 2019 15:25 )   PT: 10.3 sec;   INR: 0.90 ratio         PTT - ( 26 Sep 2019 15:25 )  PTT:27.9 sec    Type + Screen (19 @ 15:52)    ABO Interpretation: O    Rh Interpretation: Positive    Antibody Screen: Negative          29y  at 20w1d with confirmed PPROM x 2 days scheduled for D+E tomorrow    1. Dilation and Evacuation  - All records and ultrasounds have been reviewed  - Pt does not desire induction of labor  - reviewed patient’s responsibility to contact insurance company for coverage confirmation  - All consents signed today, all questions/concerns addressed  - Patient offered pamphlet for support services patient accepted; social work consult placed.  - Patient offered fetal footprints patient accepted  - Genetics: options, benefits, alternative, process reviewed. Patient will decide in AM  - Autopsy: patient declined full autopsy, elected for gross pathology only  - Reviewed disposal of remains, private burial. Patient understands responsibility to arrange private  home burial    2. Surgery scheduling  - Patient to be precertified for D+E  - D+E scheduled for 7:30a in main operating room    3. ID/Cervical prep  - GC/CT pending  - Mifepristone given today, Lot#[id number]  - rx misoprostol 400 mcg to be placed buccally 2h hours pre op  - pt declines HIV/RPR/hepatitis  - Azithromycin 1g ordered due to unknown history of gonorrhea/chlamydia  - Ibuprofen/Tylenol/Oxycodone for pain    4. Labs/Blood type  - CBC, T+S, Coags reviewed. No gross abnormalities  - Blood type O+, not a rhogam candidate    5. Contraception  - Patient counseled on all contraceptive options  - Patient does not desire post termination contraception    6. Post-op  - Post-operative follow-up visit to be scheduled in 2 weeks  - Post-operative instruction sheet given, reviewed bleeding and infection precautions  - Provided 24 hour contact phone number  - All questions/concerns addressed    Patient seen, evaluated, and discussed with Dr. Jael Mora PGY2

## 2019-09-26 NOTE — CHART NOTE - NSCHARTNOTEFT_GEN_A_CORE
ANTEPARTUM H&P (copied from [facility])    30 yo  at 20w1d c/o intermittent LOF that started acutely 2 days prior to presentation. It is exacerbated by coughing, bearing down, and standing up. She states that it is yellow in color with no blood, and she has used about 5 pads. She states the color may be due to the vaginal progesterone, but that she has been using it for a month previously without these symptoms. She endorses increased urinary frequency that also started 2 days ago. Denies dysuria, cramping, contractions, vaginal bleeding. +FM.     PNC: short cervix, on vaginal progesterone nightly that started 1 month ago. Cervical polyp that caused bleeding during the pregnancy.  PMH: Denies.  PSH: denies  Meds: PNVs  Allergies: Denies.  FHx: Asthma, Diabetes in grandmother.   POb:   Pgyn: regular periods, with intermenstrual bleeding. Denies fibroids, cysts, STDs, abnormal paps.   Social: No T/E/D. 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     Physical Exam: (performed by Dr. Chacon)  /68 HR 73 T 37  Gen: AOx3 in NAD.  Abd: Soft, nontender.   SSE: Small pooling noted in the posterior fornix. Greenish-yellow discharge present throughout. No erythema or lesions noted. +nitrazine, + ferning.   SVE: /50/-3   Ext: warm and well perfused, no cyanosis, clubbing, or edema  TAUS: Breech, Posterior Placenta, MVP 3.2     A/P:   30 yo  at 20w1d presenting with previable PPROM. VSS, no signs or symptoms of infection   - Discussed IOL vs. D&E vs. expectant management. Pt and partner decided D&E  -Consult placed with Dr. Elder of Family Planning.  -Admit for D&E. Scheduled  7:30AM in the main OR  - F/u UA, Ucx  - CBC, CMP, coags, T&Sx2  -GC/CT ()  - NPO at midnight, LR@125  - AM labs    D/w Dr. Prince AICHA Chacon PGY3

## 2019-09-27 ENCOUNTER — APPOINTMENT (OUTPATIENT)
Dept: OBGYN | Facility: CLINIC | Age: 29
End: 2019-09-27

## 2019-09-27 ENCOUNTER — RESULT REVIEW (OUTPATIENT)
Age: 29
End: 2019-09-27

## 2019-09-27 VITALS
HEART RATE: 78 BPM | OXYGEN SATURATION: 99 % | DIASTOLIC BLOOD PRESSURE: 74 MMHG | SYSTOLIC BLOOD PRESSURE: 103 MMHG | RESPIRATION RATE: 18 BRPM | TEMPERATURE: 98 F

## 2019-09-27 LAB
C TRACH RRNA SPEC QL NAA+PROBE: SIGNIFICANT CHANGE UP
CULTURE RESULTS: SIGNIFICANT CHANGE UP
N GONORRHOEA RRNA SPEC QL NAA+PROBE: SIGNIFICANT CHANGE UP
SPECIMEN SOURCE: SIGNIFICANT CHANGE UP
SPECIMEN SOURCE: SIGNIFICANT CHANGE UP

## 2019-09-27 PROCEDURE — 88305 TISSUE EXAM BY PATHOLOGIST: CPT | Mod: 26

## 2019-09-27 PROCEDURE — 59841 INDUCED ABORTION DILAT&EVAC: CPT

## 2019-09-27 PROCEDURE — 88342 IMHCHEM/IMCYTCHM 1ST ANTB: CPT | Mod: 26

## 2019-09-27 PROCEDURE — 76998 US GUIDE INTRAOP: CPT | Mod: 26

## 2019-09-27 RX ORDER — ONDANSETRON 8 MG/1
4 TABLET, FILM COATED ORAL ONCE
Refills: 0 | Status: DISCONTINUED | OUTPATIENT
Start: 2019-09-27 | End: 2019-09-27

## 2019-09-27 RX ORDER — HYDROMORPHONE HYDROCHLORIDE 2 MG/ML
1 INJECTION INTRAMUSCULAR; INTRAVENOUS; SUBCUTANEOUS
Refills: 0 | Status: DISCONTINUED | OUTPATIENT
Start: 2019-09-27 | End: 2019-09-27

## 2019-09-27 RX ORDER — FENTANYL CITRATE 50 UG/ML
25 INJECTION INTRAVENOUS
Refills: 0 | Status: DISCONTINUED | OUTPATIENT
Start: 2019-09-27 | End: 2019-09-27

## 2019-09-27 RX ORDER — SODIUM CHLORIDE 9 MG/ML
1000 INJECTION, SOLUTION INTRAVENOUS
Refills: 0 | Status: DISCONTINUED | OUTPATIENT
Start: 2019-09-27 | End: 2019-09-27

## 2019-09-27 RX ADMIN — Medication 0.2 MILLIGRAM(S): at 10:34

## 2019-09-27 RX ADMIN — SODIUM CHLORIDE 125 MILLILITER(S): 9 INJECTION, SOLUTION INTRAVENOUS at 09:50

## 2019-09-27 NOTE — ASU DISCHARGE PLAN (ADULT/PEDIATRIC) - NURSING INSTRUCTIONS
If at any time there are any signs of infection (increased swelling, redness, drainage from the incisions, warmth, fever, chills) , severe pain unrelieved by prescribed medications and if you have any questions or concerns , contact your Doctor.

## 2019-09-27 NOTE — PRE-ANESTHESIA EVALUATION ADULT - NSANTHPEFT_GEN_ALL_CORE
PHYSICAL EXAM:  GENERAL: NAD, well-developed  HEAD:  Atraumatic, Normocephalic  HEART: Regular rate and rhythm  PSYCH: AAOx3  NEUROLOGY: non-focal  SKIN: No rashes or lesions

## 2019-09-27 NOTE — ASU DISCHARGE PLAN (ADULT/PEDIATRIC) - CARE PROVIDER_API CALL
Alcides Ramirez)  Obstetrics and Gynecology  [address] [facility], [place] [address]  Phone: [phone]  Fax: [phone]  Follow Up Time:

## 2019-09-27 NOTE — ASU DISCHARGE PLAN (ADULT/PEDIATRIC) - ACTIVITY LEVEL
No intercourse/2 weeks/No weight bearing/No heavy lifting/No sports/gym/Nothing per vagina/No tampons/No tub baths/No excercise/No douching

## 2019-09-27 NOTE — PROGRESS NOTE ADULT - SUBJECTIVE AND OBJECTIVE BOX
POST-OP CHECK    Allergies    No Known Allergies    Intolerances        S: Pt awake and alert resting comfortaby in bed   Pain controlled. Pt denies N/V, SOB, CP, palpitations.    O:   T(C): 36.4 (09-27-19 @ 09:08), Max: 36.4 (09-27-19 @ 09:08)  HR: 75 (09-27-19 @ 11:00) (72 - 81)  BP: 105/65 (09-27-19 @ 11:00) (101/56 - 112/61)  RR: 17 (09-27-19 @ 11:00) (17 - 22)  SpO2: 96% (09-27-19 @ 11:00) (96% - 100%)  Wt(kg): --  I&O's Summary    27 Sep 2019 07:01  -  27 Sep 2019 11:55  --------------------------------------------------------  IN: 380 mL / OUT: 350 mL / NET: 30 mL        Gen: A&Ox3  CV: S1S2, RRR  Lungs: CTA B/L  Abd: soft, appropriately tender, occassional BS x 4 quadrants  Inc: Clean/dry/intact  : fragoso d/c'ed, pt voided 3x with pale yellow urine  Perineum: minimal vaginal bleeding noted, fundus is firm  Ext: PAS in place and functioning, Neg Homans B/L. no swelling, redness noted    A/P: 29y Female S/P D+E  with  No significant past medical history      -Continue routine care  -Analgesia PRN  -OOB with assistance x 2, then Ad Martina  -Meds:   azithromycin  IVPB 1000 milliGRAM(s) IV Intermittent once  fentaNYL    Injectable 25 MICROGram(s) IV Push every 5 minutes PRN  HYDROmorphone  Injectable 1 milliGRAM(s) IV Push every 10 minutes PRN  lactated ringers. 1000 milliLiter(s) IV Continuous <Continuous>  ondansetron Injectable 4 milliGRAM(s) IV Push once PRN        -CV: hemodynically stable  -Resp: no active issues, Incentive spirometer at bedside  -GI:   reg diet   - : patient is voiding adequately  -DVT PPX: early ambulation   Pain Management: tylenol, motrin  -F/E/N: LR 125mL/hr  -Dispo: to home when discharge criteria are met    Lissette Sarabia NP

## 2019-10-14 ENCOUNTER — APPOINTMENT (OUTPATIENT)
Dept: OBGYN | Facility: CLINIC | Age: 29
End: 2019-10-14
Payer: MEDICAID

## 2019-10-14 ENCOUNTER — OUTPATIENT (OUTPATIENT)
Dept: OUTPATIENT SERVICES | Facility: HOSPITAL | Age: 29
LOS: 1 days | End: 2019-10-14
Payer: MEDICAID

## 2019-10-14 VITALS
SYSTOLIC BLOOD PRESSURE: 120 MMHG | HEIGHT: 62 IN | WEIGHT: 126 LBS | DIASTOLIC BLOOD PRESSURE: 80 MMHG | BODY MASS INDEX: 23.19 KG/M2

## 2019-10-14 DIAGNOSIS — N76.0 ACUTE VAGINITIS: ICD-10-CM

## 2019-10-14 PROCEDURE — G0463: CPT

## 2019-10-14 PROCEDURE — 99024 POSTOP FOLLOW-UP VISIT: CPT

## 2019-10-16 NOTE — END OF VISIT
[] : Resident [FreeTextEntry3] : reviewed plan with pt and her partner, all questions/concerns addressed

## 2019-10-16 NOTE — HISTORY OF PRESENT ILLNESS
[0/10] : no pain reported [Normal] : the vagina was normal [None] : no vaginal bleeding [Doing Well] : is doing well [Fever] : no fever [Chills] : no chills [Nausea] : no nausea [Vomiting] : no vomiting [Diarrhea] : no diarrhea [Vaginal Bleeding] : no vaginal bleeding [Pelvic Pressure] : no pelvic pressure [Dysuria] : no dysuria [Vaginal Discharge] : no vaginal discharge [Constipation] : no constipation [Hematoma] : no vaginal hematoma [Abscess Formation] : no vaginal abscess [de-identified] : speculum exam revealed nl external genitalia, vagina wnl no lesions, cervix wnl no lesions, no bleeding noted, normal clear vaginal discharge noted, non tender, no CMT

## 2019-10-16 NOTE — CHIEF COMPLAINT
[Procedure: ___] : status post [unfilled] [Post Op Day: ___] : post op day #[unfilled] [Spouse] : spouse [de-identified] : 9/27/19

## 2019-10-21 DIAGNOSIS — Z48.89 ENCOUNTER FOR OTHER SPECIFIED SURGICAL AFTERCARE: ICD-10-CM

## 2019-10-23 ENCOUNTER — RESULT REVIEW (OUTPATIENT)
Age: 29
End: 2019-10-23

## 2019-10-23 LAB — SURGICAL PATHOLOGY STUDY: SIGNIFICANT CHANGE UP

## 2019-10-31 PROCEDURE — 85027 COMPLETE CBC AUTOMATED: CPT

## 2019-10-31 PROCEDURE — 80053 COMPREHEN METABOLIC PANEL: CPT

## 2019-10-31 PROCEDURE — 85610 PROTHROMBIN TIME: CPT

## 2019-10-31 PROCEDURE — G0463: CPT

## 2019-10-31 PROCEDURE — 87491 CHLMYD TRACH DNA AMP PROBE: CPT

## 2019-10-31 PROCEDURE — 87086 URINE CULTURE/COLONY COUNT: CPT

## 2019-10-31 PROCEDURE — 81001 URINALYSIS AUTO W/SCOPE: CPT

## 2019-10-31 PROCEDURE — 87591 N.GONORRHOEAE DNA AMP PROB: CPT

## 2019-10-31 PROCEDURE — 85730 THROMBOPLASTIN TIME PARTIAL: CPT

## 2019-10-31 PROCEDURE — 86850 RBC ANTIBODY SCREEN: CPT

## 2019-10-31 PROCEDURE — 86900 BLOOD TYPING SEROLOGIC ABO: CPT

## 2019-10-31 PROCEDURE — 86901 BLOOD TYPING SEROLOGIC RH(D): CPT

## 2019-10-31 PROCEDURE — 88305 TISSUE EXAM BY PATHOLOGIST: CPT

## 2019-10-31 PROCEDURE — C1889: CPT

## 2019-10-31 PROCEDURE — 88342 IMHCHEM/IMCYTCHM 1ST ANTB: CPT

## 2022-04-20 NOTE — CONSULT NOTE ADULT - CONSULT REQUESTED DATE/TIME
Detail Level: Zone
Other (Free Text): Use Panoxyl and Head and Shoulders for a bit. bbw
21-Jul-2019 04:15

## 2023-04-06 ENCOUNTER — TRANSCRIPTION ENCOUNTER (OUTPATIENT)
Age: 33
End: 2023-04-06

## 2023-04-07 ENCOUNTER — OUTPATIENT (OUTPATIENT)
Dept: OUTPATIENT SERVICES | Facility: HOSPITAL | Age: 33
LOS: 1 days | End: 2023-04-07
Payer: MEDICAID

## 2023-04-07 ENCOUNTER — TRANSCRIPTION ENCOUNTER (OUTPATIENT)
Age: 33
End: 2023-04-07

## 2023-04-07 VITALS
DIASTOLIC BLOOD PRESSURE: 58 MMHG | RESPIRATION RATE: 18 BRPM | HEART RATE: 66 BPM | TEMPERATURE: 98 F | SYSTOLIC BLOOD PRESSURE: 104 MMHG

## 2023-04-07 VITALS
DIASTOLIC BLOOD PRESSURE: 55 MMHG | SYSTOLIC BLOOD PRESSURE: 116 MMHG | RESPIRATION RATE: 18 BRPM | TEMPERATURE: 99 F | HEART RATE: 76 BPM | OXYGEN SATURATION: 100 %

## 2023-04-07 DIAGNOSIS — Z98.890 OTHER SPECIFIED POSTPROCEDURAL STATES: Chronic | ICD-10-CM

## 2023-04-07 DIAGNOSIS — O34.31 MATERNAL CARE FOR CERVICAL INCOMPETENCE, FIRST TRIMESTER: ICD-10-CM

## 2023-04-07 DIAGNOSIS — N84.1 POLYP OF CERVIX UTERI: ICD-10-CM

## 2023-04-07 LAB
BLD GP AB SCN SERPL QL: NEGATIVE — SIGNIFICANT CHANGE UP
HCT VFR BLD CALC: 33 % — LOW (ref 34.5–45)
HGB BLD-MCNC: 11.3 G/DL — LOW (ref 11.5–15.5)
MCHC RBC-ENTMCNC: 30.9 PG — SIGNIFICANT CHANGE UP (ref 27–34)
MCHC RBC-ENTMCNC: 34.2 GM/DL — SIGNIFICANT CHANGE UP (ref 32–36)
MCV RBC AUTO: 90.2 FL — SIGNIFICANT CHANGE UP (ref 80–100)
NRBC # BLD: 0 /100 WBCS — SIGNIFICANT CHANGE UP (ref 0–0)
PLATELET # BLD AUTO: 214 K/UL — SIGNIFICANT CHANGE UP (ref 150–400)
RBC # BLD: 3.66 M/UL — LOW (ref 3.8–5.2)
RBC # FLD: 12.7 % — SIGNIFICANT CHANGE UP (ref 10.3–14.5)
RH IG SCN BLD-IMP: POSITIVE — SIGNIFICANT CHANGE UP
WBC # BLD: 5.26 K/UL — SIGNIFICANT CHANGE UP (ref 3.8–10.5)
WBC # FLD AUTO: 5.26 K/UL — SIGNIFICANT CHANGE UP (ref 3.8–10.5)

## 2023-04-07 PROCEDURE — 59320 REVISION OF CERVIX: CPT

## 2023-04-07 PROCEDURE — 86900 BLOOD TYPING SEROLOGIC ABO: CPT

## 2023-04-07 PROCEDURE — 36415 COLL VENOUS BLD VENIPUNCTURE: CPT

## 2023-04-07 PROCEDURE — 86901 BLOOD TYPING SEROLOGIC RH(D): CPT

## 2023-04-07 PROCEDURE — 86850 RBC ANTIBODY SCREEN: CPT

## 2023-04-07 PROCEDURE — 85027 COMPLETE CBC AUTOMATED: CPT

## 2023-04-07 RX ORDER — SODIUM CHLORIDE 9 MG/ML
1000 INJECTION, SOLUTION INTRAVENOUS
Refills: 0 | Status: DISCONTINUED | OUTPATIENT
Start: 2023-04-07 | End: 2023-04-21

## 2023-04-07 RX ORDER — ACETAMINOPHEN 500 MG
750 TABLET ORAL ONCE
Refills: 0 | Status: COMPLETED | OUTPATIENT
Start: 2023-04-07 | End: 2023-04-07

## 2023-04-07 RX ORDER — CITRIC ACID/SODIUM CITRATE 300-500 MG
15 SOLUTION, ORAL ORAL ONCE
Refills: 0 | Status: COMPLETED | OUTPATIENT
Start: 2023-04-07 | End: 2023-04-07

## 2023-04-07 RX ADMIN — Medication 750 MILLIGRAM(S): at 17:19

## 2023-04-07 RX ADMIN — Medication 300 MILLIGRAM(S): at 16:16

## 2023-04-07 RX ADMIN — SODIUM CHLORIDE 125 MILLILITER(S): 9 INJECTION, SOLUTION INTRAVENOUS at 12:10

## 2023-04-07 RX ADMIN — SODIUM CHLORIDE 125 MILLILITER(S): 9 INJECTION, SOLUTION INTRAVENOUS at 17:30

## 2023-04-07 RX ADMIN — Medication 15 MILLILITER(S): at 13:07

## 2023-04-07 NOTE — DISCHARGE NOTE ANTEPARTUM - NS MD DC FALL RISK RISK
For information on Fall & Injury Prevention, visit: [email or web address] OR  [email or web address] OR  https://www.cdc.gov/steadi/patient.html

## 2023-04-07 NOTE — OB PROVIDER H&P - NSHPPHYSICALEXAM_GEN_ALL_CORE
Vital Signs Last 24 Hrs  T(C): 36.5 (07 Apr 2023 12:28), Max: 36.5 (07 Apr 2023 12:28)  T(F): 97.7 (07 Apr 2023 12:28), Max: 97.7 (07 Apr 2023 12:28)  HR: 79 (07 Apr 2023 12:48) (66 - 91)  BP: 104/58 (07 Apr 2023 12:28) (104/58 - 104/58)  BP(mean): --  RR: 18 (07 Apr 2023 12:28) (18 - 18)  SpO2: 100% (07 Apr 2023 12:48) (95% - 100%)    Parameters below as of 07 Apr 2023 12:28  Patient On (Oxygen Delivery Method): room air    General: well appearing, NAD   Pulmonary: breathing comfortably on room air  Cardiovascular: extremities well perfused   TAUS: +    VE: deferred

## 2023-04-07 NOTE — BRIEF OPERATIVE NOTE - NSICDXBRIEFPREOP_GEN_ALL_CORE_FT
PRE-OP DIAGNOSIS:  History of  premature rupture of membranes (PPROM) 2023 14:28:21  Fareed Arriaga

## 2023-04-07 NOTE — BRIEF OPERATIVE NOTE - NSICDXBRIEFPOSTOP_GEN_ALL_CORE_FT
POST-OP DIAGNOSIS:  History of  premature rupture of membranes (PPROM) 2023 14:28:33  Fareed Arriaga

## 2023-04-07 NOTE — OB RN PATIENT PROFILE - NS_SUPPORTPERSONNAME_OBGYN_ALL_OB_FT
Yoan [facility] [phone] Yoan [facility] [phone] Yoan Galvan

## 2023-04-07 NOTE — DISCHARGE NOTE ANTEPARTUM - CARE PROVIDER_API CALL
Alcides Ramirez)  Obstetrics and Gynecology  [address] [facility], [address] [place] [address]  Phone: [phone]  Fax: [phone]  Follow Up Time: 1 week

## 2023-04-07 NOTE — DISCHARGE NOTE ANTEPARTUM - MEDICATION SUMMARY - MEDICATIONS TO STOP TAKING
I will STOP taking the medications listed below when I get home from the hospital:    ibuprofen 600 mg oral tablet  -- 1 tab(s) by mouth every 6 hours, As needed, Mild Pain (1 - 3)

## 2023-04-07 NOTE — OB RN INTRAOPERATIVE NOTE - NSOBSELHIDDEN_OBGYN_ALL_OB_FT
[NSOBAttendingProcedure1_OBGYN_ALL_OB_FT:[id number]=],[NSRNCirculatorProcedure1_OBGYN_ALL_OB_FT:[id number]==]

## 2023-04-07 NOTE — DISCHARGE NOTE ANTEPARTUM - PATIENT PORTAL LINK FT
You can access the FollowMyHealth Patient Portal offered by [facility] by registering at the following website: [email or web address]. By joining [facility]’s FollowMyHealth portal, you will also be able to view your health information using other applications (apps) compatible with our system.

## 2023-04-07 NOTE — OB PROVIDER H&P - NSLOWPPHRISK_OBGYN_A_OB
No previous uterine incision/Sanchez Pregnancy/Less than or equal to 4 previous vaginal births/No known bleeding disorder/No history of postpartum hemorrhage/No other PPH risks indicated

## 2023-04-07 NOTE — OB PROVIDER H&P - NS_OBGYNHISTORY_OBGYN_ALL_OB_FT
OBHx:   - 09/27/2019 D&E @20w due to PPROM   GynHx: + polyp, denies any fibroids, abnl pap smears, STIs

## 2023-04-07 NOTE — DISCHARGE NOTE ANTEPARTUM - PLAN OF CARE
Patient had scheduled cerclage placement with no complications, EBL 5. Patient was able to void, tolerate po, pain well managed, ambulating.

## 2023-04-07 NOTE — DISCHARGE NOTE ANTEPARTUM - MEDICATION SUMMARY - MEDICATIONS TO TAKE
I will START or STAY ON the medications listed below when I get home from the hospital:    acetaminophen 325 mg oral tablet  -- 3 tab(s) by mouth every 6 hours, As needed, Moderate Pain (4 - 6)  -- Indication: For Pain    Prenatal Multivitamins oral tablet  -- 1 tab(s) by mouth once a day  -- Indication: For Multivitamin

## 2023-04-07 NOTE — DISCHARGE NOTE ANTEPARTUM - CARE PLAN
1 Principal Discharge DX:	Lucas cerclage present  Assessment and plan of treatment:	Patient had scheduled cerclage placement with no complications, EBL 5. Patient was able to void, tolerate po, pain well managed, ambulating.

## 2023-04-07 NOTE — OB PROVIDER H&P - NSHPSOCIALHISTORY_GEN_ALL_CORE
Psych: denies any anxiety or depression   Social: denies any alcohol, tobacco, illicit substance use

## 2023-04-07 NOTE — DISCHARGE NOTE ANTEPARTUM - ADDITIONAL INSTRUCTIONS
Return to labor and delivery if you have vaginal bleeding, leakage of fluid, regular contractions, or the baby is not moving well. Do not place anything in your vagina unless otherwise told by your physician. Follow up with Dr. Ramirez in one week.

## 2023-04-07 NOTE — DISCHARGE NOTE ANTEPARTUM - HOSPITAL COURSE
32 y.o.  @12w3d presenting to L+D for scheduled cerclage placement. Lucas cerclage was successfully able to be placed with no complications. Patient was able to void, tolerate po, and pain was well managed. She was discharged in stable condition with instruction to follow up with OB as scheduled.

## 2023-04-07 NOTE — BRIEF OPERATIVE NOTE - OPERATION/FINDINGS
EUA with external os 0.5cm dilated, internal os closed. Lucas cerclage performed in routine fashion with Tri-Cron suture. Cervix closed following procedure. EUA with external os 0.5cm dilated, internal os closed. Lucas cerclage performed in routine fashion with Ti-Cron suture. Cervix closed following procedure. EUA with external os 0.5cm dilated, internal os closed. Lucas cerclage performed in routine fashion with Ti-Cron suture. Cervix closed following procedure. FHR confirmed following procedure

## 2023-04-07 NOTE — OB PROVIDER H&P - HISTORY OF PRESENT ILLNESS
R3 OB Provider H&P     32 y.o.  @12w3d presenting to L+D for cerclage placement + possible polypectomy. Patient reports vaginal spotting which she has had with polyp present, otherwise denies any LOF or ctx.     OBHx:   - 2019 D&E @20w due to PPROM   GynHx: + polyp, denies any fibroids, abnl pap smears, STIs   PMHx: denies   Meds: PNV   SHx: D&E   Psych: denies any anxiety or depression   Social: denies any alcohol, tobacco, illicit substance use   All: latex, NKDA

## 2023-04-07 NOTE — OB PROVIDER H&P - ASSESSMENT
32 y.o.  @12w3d presenting to L+D for cerclage placement + possible polypectomy. + FHR, patient with stable vital signs.     - NPO   - IVF   - Patient consents done at bedside for cerclage + possible polypectomy     d/w Dr. Prince Fareed Arriaga PGY-3

## 2023-04-07 NOTE — PRE-ANESTHESIA EVALUATION ADULT - NSANTHADDINFOFT_GEN_ALL_CORE
spinal explained in detail;  risks include but not limited to HA, failure, nv injury.  All questions answered.

## 2023-09-07 PROBLEM — O03.9 COMPLETE OR UNSPECIFIED SPONTANEOUS ABORTION WITHOUT COMPLICATION: Chronic | Status: ACTIVE | Noted: 2023-04-07

## 2023-09-07 PROBLEM — N84.2 POLYP OF VAGINA: Chronic | Status: ACTIVE | Noted: 2023-04-07

## 2023-10-08 ENCOUNTER — INPATIENT (INPATIENT)
Facility: HOSPITAL | Age: 33
LOS: 2 days | Discharge: HOME | End: 2023-10-11
Attending: OBSTETRICS & GYNECOLOGY | Admitting: OBSTETRICS & GYNECOLOGY
Payer: MEDICAID

## 2023-10-08 ENCOUNTER — TRANSCRIPTION ENCOUNTER (OUTPATIENT)
Age: 33
End: 2023-10-08

## 2023-10-08 VITALS
OXYGEN SATURATION: 97 % | HEART RATE: 81 BPM | SYSTOLIC BLOOD PRESSURE: 112 MMHG | RESPIRATION RATE: 18 BRPM | TEMPERATURE: 99 F | DIASTOLIC BLOOD PRESSURE: 76 MMHG

## 2023-10-08 DIAGNOSIS — O26.899 OTHER SPECIFIED PREGNANCY RELATED CONDITIONS, UNSPECIFIED TRIMESTER: ICD-10-CM

## 2023-10-08 DIAGNOSIS — Z98.890 OTHER SPECIFIED POSTPROCEDURAL STATES: Chronic | ICD-10-CM

## 2023-10-08 DIAGNOSIS — Z34.80 ENCOUNTER FOR SUPERVISION OF OTHER NORMAL PREGNANCY, UNSPECIFIED TRIMESTER: ICD-10-CM

## 2023-10-08 LAB
BASOPHILS # BLD AUTO: 0.03 K/UL — SIGNIFICANT CHANGE UP (ref 0–0.2)
BASOPHILS NFR BLD AUTO: 0.3 % — SIGNIFICANT CHANGE UP (ref 0–2)
BLD GP AB SCN SERPL QL: NEGATIVE — SIGNIFICANT CHANGE UP
EOSINOPHIL # BLD AUTO: 0.17 K/UL — SIGNIFICANT CHANGE UP (ref 0–0.5)
EOSINOPHIL NFR BLD AUTO: 2 % — SIGNIFICANT CHANGE UP (ref 0–6)
HCT VFR BLD CALC: 33.8 % — LOW (ref 34.5–45)
HGB BLD-MCNC: 11.6 G/DL — SIGNIFICANT CHANGE UP (ref 11.5–15.5)
IMM GRANULOCYTES NFR BLD AUTO: 0.5 % — SIGNIFICANT CHANGE UP (ref 0–0.9)
LYMPHOCYTES # BLD AUTO: 1.24 K/UL — SIGNIFICANT CHANGE UP (ref 1–3.3)
LYMPHOCYTES # BLD AUTO: 14.4 % — SIGNIFICANT CHANGE UP (ref 13–44)
MCHC RBC-ENTMCNC: 31.5 PG — SIGNIFICANT CHANGE UP (ref 27–34)
MCHC RBC-ENTMCNC: 34.3 GM/DL — SIGNIFICANT CHANGE UP (ref 32–36)
MCV RBC AUTO: 91.8 FL — SIGNIFICANT CHANGE UP (ref 80–100)
MONOCYTES # BLD AUTO: 0.42 K/UL — SIGNIFICANT CHANGE UP (ref 0–0.9)
MONOCYTES NFR BLD AUTO: 4.9 % — SIGNIFICANT CHANGE UP (ref 2–14)
NEUTROPHILS # BLD AUTO: 6.72 K/UL — SIGNIFICANT CHANGE UP (ref 1.8–7.4)
NEUTROPHILS NFR BLD AUTO: 77.9 % — HIGH (ref 43–77)
NRBC # BLD: 0 /100 WBCS — SIGNIFICANT CHANGE UP (ref 0–0)
PLATELET # BLD AUTO: 240 K/UL — SIGNIFICANT CHANGE UP (ref 150–400)
RBC # BLD: 3.68 M/UL — LOW (ref 3.8–5.2)
RBC # FLD: 13.1 % — SIGNIFICANT CHANGE UP (ref 10.3–14.5)
RH IG SCN BLD-IMP: POSITIVE — SIGNIFICANT CHANGE UP
WBC # BLD: 8.62 K/UL — SIGNIFICANT CHANGE UP (ref 3.8–10.5)
WBC # FLD AUTO: 8.62 K/UL — SIGNIFICANT CHANGE UP (ref 3.8–10.5)

## 2023-10-08 RX ORDER — SODIUM CHLORIDE 9 MG/ML
1000 INJECTION, SOLUTION INTRAVENOUS
Refills: 0 | Status: DISCONTINUED | OUTPATIENT
Start: 2023-10-08 | End: 2023-10-09

## 2023-10-08 RX ORDER — CITRIC ACID/SODIUM CITRATE 300-500 MG
15 SOLUTION, ORAL ORAL EVERY 6 HOURS
Refills: 0 | Status: DISCONTINUED | OUTPATIENT
Start: 2023-10-08 | End: 2023-10-09

## 2023-10-08 RX ORDER — ONDANSETRON 8 MG/1
4 TABLET, FILM COATED ORAL ONCE
Refills: 0 | Status: COMPLETED | OUTPATIENT
Start: 2023-10-08 | End: 2023-10-08

## 2023-10-08 RX ORDER — OXYTOCIN 10 UNIT/ML
VIAL (ML) INJECTION
Qty: 30 | Refills: 0 | Status: DISCONTINUED | OUTPATIENT
Start: 2023-10-08 | End: 2023-10-09

## 2023-10-08 RX ORDER — OXYTOCIN 10 UNIT/ML
333.33 VIAL (ML) INJECTION
Qty: 20 | Refills: 0 | Status: DISCONTINUED | OUTPATIENT
Start: 2023-10-08 | End: 2023-10-09

## 2023-10-08 RX ORDER — CHLORHEXIDINE GLUCONATE 213 G/1000ML
1 SOLUTION TOPICAL DAILY
Refills: 0 | Status: DISCONTINUED | OUTPATIENT
Start: 2023-10-08 | End: 2023-10-09

## 2023-10-08 RX ADMIN — Medication 2 MILLIUNIT(S)/MIN: at 20:36

## 2023-10-08 RX ADMIN — ONDANSETRON 4 MILLIGRAM(S): 8 TABLET, FILM COATED ORAL at 21:09

## 2023-10-08 NOTE — OB PROVIDER H&P - NSLOWPPHRISK_OBGYN_A_OB
No previous uterine incision/Sanchez Pregnancy/Less than or equal to 4 previous vaginal births/No known bleeding disorder/No history of postpartum hemorrhage/No other PPH risks indicated No previous uterine incision/Sanchez Pregnancy/Less than or equal to 4 previous vaginal births/No known bleeding disorder/No history of postpartum hemorrhage

## 2023-10-08 NOTE — OB PROVIDER H&P - MLM HIDDEN
----- Message from Loan Mann sent at 10/17/2017  8:09 AM EDT -----  Regarding: Refills  Contact: [phone]  Pt needs refills on her pain and nerve medications.  [phone]  
Xanax and Oxycontin prescription refills approved by Dr. Bolaños and refill Rx written.  Pt's daughter called to clinic and is aware that they are ready for .   
yes

## 2023-10-08 NOTE — OB RN PATIENT PROFILE - BREAST MILK PROVIDES COLOSTRUM THAT IS HIGH IN PROTEIN
[address] [facility]   Progress Note and Procedure Note      [address] [facility] RECORD NUMBER:  [id number]  AGE: 78 y.o. GENDER: male  : 1939  EPISODE DATE:  2023    Subjective:     Chief Complaint   Patient presents with    Wound Check     Lower legs         HISTORY of PRESENT ILLNESS HPI     Kenji River is a 78 y.o. male who presents today for wound/ulcer evaluation. History of Wound Context: here with daughter to follow up on bilateral lower leg wounds. Daughter is wrapping twice weekly. He will see lymphedema clinic tomorrow and follow up here in 2 weeks. Wound/Ulcer Pain Timing/Severity: none  Quality of pain: N/A  Severity:  0 / 10   Modifying Factors: None  Associated Signs/Symptoms: none    Ulcer Identification:  Ulcer Type: lymphedema  Contributing Factors: edema, lymphedema, decreased mobility, and obesity         PAST MEDICAL HISTORY        Diagnosis Date    Arthritis     CHF (congestive heart failure) (HCC)     Chronic kidney disease     History of blood transfusion     Hyperlipidemia        PAST SURGICAL HISTORY    Past Surgical History:   Procedure Laterality Date    CARDIAC SURGERY      bypass    JOINT REPLACEMENT      Left hip    PACEMAKER PLACEMENT Right     and defib       FAMILY HISTORY    History reviewed. No pertinent family history.     SOCIAL HISTORY    Social History     Tobacco Use    Smoking status: Former     Types: Cigarettes     Quit date:      Years since quittin.5     Passive exposure: Never    Smokeless tobacco: Never   Vaping Use    Vaping Use: Never used   Substance Use Topics    Alcohol use: Not Currently    Drug use: Never       ALLERGIES    No Known Allergies    MEDICATIONS    Current Outpatient Medications on File Prior to Encounter   Medication Sig Dispense Refill    potassium chloride (KLOR-CON) 10 MEQ extended release tablet TAKE 1 TABLET EVERY DAY 90 tablet 3    clopidogrel (PLAVIX) 75 MG tablet TAKE 1 TABLET
[address]:     [address] [place] [address] [place] [address] [facility] [address]  [phone]  W[facility] C[facility] [unfilled]    Patient Information:      [place] [address],  [address] [facility]   [phone]   : 1939  AGE: 78 y.o. 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 ([address]) - Primary    Relevant Orders    Initiate Outpatient Wound Care Protocol    Non-pressure chronic ulcer of other part of left lower leg limited to breakdown of skin [facility])    Relevant Orders    Initiate Outpatient Wound Care Protocol       WOUNDS REQUIRING DRESSING SUPPLIES:     Wound 05/10/23 Leg Right;Lateral #2right lower leg cluster (Active)   Wound Image   23 1456   Wound Etiology Venous 23 145   Dressing Status New drainage noted; Old drainage noted 23 1456   Wound Cleansed Soap and water 23 145   Dressing/Treatment Other (comment) 23 1623   Wound Length (cm) 5.5 cm 23 1456   Wound Width (cm) 11.5 cm 23 1456   Wound Depth (cm) 0.3 cm 23 1456   Wound Surface Area (cm^2) 63.25 cm^2 23 1456   Change in Wound Size % (l*w) -2430 23 145   Wound Volume (cm^3) 18.975 cm^3 23 1456   Wound Healing % -7490 23 1456   Post-Procedure Length (cm) 5.5 cm 23 1456   Post-Procedure Width (cm) 11.5 cm 23   Post-Procedure Depth (cm) 0.1 cm 23   Post-Procedure Surface Area (cm^2) 63.25 cm^2 236
Multilayer Compression Wrap   (Not Unna) Below the Knee    NAME:  Babatunde Martinez  YOB: 1939  MEDICAL RECORD NUMBER:  [id number]  DATE:  7/6/2023    Multilayer compression wrap: Removed old Multilayer wrap if indicated and wash leg with mild soap/water. Applied moisturizing agent to dry skin as needed. Applied primary and secondary dressing as ordered. Applied multilayered dressing below the knee to right lower leg. Applied multilayered dressing below the knee to left lower leg. Instructed patient/caregiver not to remove dressing and to keep it clean and dry. Instructed patient/caregiver on complications to report to provider, such as pain, numbness in toes, heavy drainage, and slippage of dressing. Instructed patient on purpose of compression dressing and on activity and exercise recommendations.       Electronically signed by Ellen Bell RN on 7/6/2023 at 3:31 PM
Statement Selected

## 2023-10-08 NOTE — OB PROVIDER H&P - HISTORY OF PRESENT ILLNESS
HPI: Pt is a 33y  @ 38.5wga presenting for contractions starting at midnight that worsened at 6a, q5-7min  FM (+)  LOF (-)  CTX, above   VB (-)  GBS neg  EFW: 6#8oz by palpation, 2949g    PNC complicated by cervical insufficiency s/p Lucas cerclage @ 12.3wga (removed at 37wga  - Prior Previable PPROM in 2019 at 20wga s/p D&E    OBHx: Above   GynHx: D&E as above   PMHx: Denies  PSHx: Denies  Med: PNV  All: NKDA  Psych: Denies hx of mental health issues  SH: Denies hx of smoking, drinking, or drug usage during the pregnancy    Vital Signs Last 24 Hrs  T(C): 37.1 (08 Oct 2023 11:29), Max: 37.1 (08 Oct 2023 11:23)  T(F): 98.8 (08 Oct 2023 11:29), Max: 98.8 (08 Oct 2023 11:29)  HR: 82 (08 Oct 2023 11:48) (76 - 90)  BP: 112/76 (08 Oct 2023 11:29) (112/76 - 112/76)  BP(mean): --  RR: 18 (08 Oct 2023 11:29) (18 - 18)  SpO2: 98% (08 Oct 2023 11:48) (97% - 98%)    Parameters below as of 08 Oct 2023 11:29  Patient On (Oxygen Delivery Method): room air        SVE: 4/80/-3  FHT: 140/mod/(-)accels/(-)decels  [place]: q3-5min  Sono: Vertex

## 2023-10-08 NOTE — OB PROVIDER H&P - ASSESSMENT
A/P: Pt is a 33y  @ 38.5wga admitted in labor     1. Admit to LND. Routine Labs. IVF  2. Expectant Management   3. Fetus: Vertex, Reactive/CEFM  4. GBS neg  5. Epidural PRN   6. PNC   - HbsAg and HIV negative     Shalom Luna, PGY-2  Obstetrics and Gynecology    Discussed with Dr. M. Oppenheim A/P: Pt is a 33y  @ 38.5wga admitted in labor     1. Admit to LND. Routine Labs. IVF  2. Expectant Management   3. Fetus: Vertex, Reactive/CEFM  4. GBS neg  5. Epidural PRN   6. PNC   - HbsAg and HIV negative     Shalom Luna, PGY-2  Obstetrics and Gynecology    Discussed with Dr. M. Oppenheim      pt seen and eval by me   doing well   agree with above   M. Oppenheim, MD

## 2023-10-09 LAB — T PALLIDUM AB TITR SER: NEGATIVE — SIGNIFICANT CHANGE UP

## 2023-10-09 RX ORDER — POLYETHYLENE GLYCOL 3350 17 G/17G
17 POWDER, FOR SOLUTION ORAL DAILY
Refills: 0 | Status: DISCONTINUED | OUTPATIENT
Start: 2023-10-09 | End: 2023-10-11

## 2023-10-09 RX ORDER — PRAMOXINE HYDROCHLORIDE 150 MG/15G
1 AEROSOL, FOAM RECTAL EVERY 4 HOURS
Refills: 0 | Status: DISCONTINUED | OUTPATIENT
Start: 2023-10-09 | End: 2023-10-11

## 2023-10-09 RX ORDER — KETOROLAC TROMETHAMINE 30 MG/ML
30 SYRINGE (ML) INJECTION ONCE
Refills: 0 | Status: DISCONTINUED | OUTPATIENT
Start: 2023-10-09 | End: 2023-10-09

## 2023-10-09 RX ORDER — AER TRAVELER 0.5 G/1
1 SOLUTION RECTAL; TOPICAL EVERY 4 HOURS
Refills: 0 | Status: DISCONTINUED | OUTPATIENT
Start: 2023-10-09 | End: 2023-10-11

## 2023-10-09 RX ORDER — SIMETHICONE 80 MG/1
80 TABLET, CHEWABLE ORAL EVERY 4 HOURS
Refills: 0 | Status: DISCONTINUED | OUTPATIENT
Start: 2023-10-09 | End: 2023-10-11

## 2023-10-09 RX ORDER — MAGNESIUM HYDROXIDE 400 MG/1
30 TABLET, CHEWABLE ORAL
Refills: 0 | Status: DISCONTINUED | OUTPATIENT
Start: 2023-10-09 | End: 2023-10-11

## 2023-10-09 RX ORDER — OXYCODONE HYDROCHLORIDE 5 MG/1
5 TABLET ORAL ONCE
Refills: 0 | Status: DISCONTINUED | OUTPATIENT
Start: 2023-10-09 | End: 2023-10-11

## 2023-10-09 RX ORDER — DIPHENHYDRAMINE HCL 50 MG
25 CAPSULE ORAL EVERY 6 HOURS
Refills: 0 | Status: DISCONTINUED | OUTPATIENT
Start: 2023-10-09 | End: 2023-10-11

## 2023-10-09 RX ORDER — SENNA PLUS 8.6 MG/1
1 TABLET ORAL DAILY
Refills: 0 | Status: DISCONTINUED | OUTPATIENT
Start: 2023-10-09 | End: 2023-10-11

## 2023-10-09 RX ORDER — IBUPROFEN 200 MG
600 TABLET ORAL EVERY 6 HOURS
Refills: 0 | Status: DISCONTINUED | OUTPATIENT
Start: 2023-10-09 | End: 2023-10-11

## 2023-10-09 RX ORDER — TETANUS TOXOID, REDUCED DIPHTHERIA TOXOID AND ACELLULAR PERTUSSIS VACCINE, ADSORBED 5; 2.5; 8; 8; 2.5 [IU]/.5ML; [IU]/.5ML; UG/.5ML; UG/.5ML; UG/.5ML
0.5 SUSPENSION INTRAMUSCULAR ONCE
Refills: 0 | Status: DISCONTINUED | OUTPATIENT
Start: 2023-10-09 | End: 2023-10-11

## 2023-10-09 RX ORDER — DIBUCAINE 1 %
1 OINTMENT (GRAM) RECTAL EVERY 6 HOURS
Refills: 0 | Status: DISCONTINUED | OUTPATIENT
Start: 2023-10-09 | End: 2023-10-11

## 2023-10-09 RX ORDER — BENZOCAINE 10 %
1 GEL (GRAM) MUCOUS MEMBRANE EVERY 6 HOURS
Refills: 0 | Status: DISCONTINUED | OUTPATIENT
Start: 2023-10-09 | End: 2023-10-11

## 2023-10-09 RX ORDER — ACETAMINOPHEN 500 MG
975 TABLET ORAL
Refills: 0 | Status: DISCONTINUED | OUTPATIENT
Start: 2023-10-09 | End: 2023-10-11

## 2023-10-09 RX ORDER — LANOLIN
1 OINTMENT (GRAM) TOPICAL EVERY 6 HOURS
Refills: 0 | Status: DISCONTINUED | OUTPATIENT
Start: 2023-10-09 | End: 2023-10-11

## 2023-10-09 RX ORDER — SODIUM CHLORIDE 9 MG/ML
3 INJECTION INTRAMUSCULAR; INTRAVENOUS; SUBCUTANEOUS EVERY 8 HOURS
Refills: 0 | Status: DISCONTINUED | OUTPATIENT
Start: 2023-10-09 | End: 2023-10-11

## 2023-10-09 RX ORDER — IBUPROFEN 200 MG
600 TABLET ORAL EVERY 6 HOURS
Refills: 0 | Status: COMPLETED | OUTPATIENT
Start: 2023-10-09 | End: 2024-09-06

## 2023-10-09 RX ORDER — HYDROCORTISONE 1 %
1 OINTMENT (GRAM) TOPICAL EVERY 6 HOURS
Refills: 0 | Status: DISCONTINUED | OUTPATIENT
Start: 2023-10-09 | End: 2023-10-11

## 2023-10-09 RX ORDER — OXYCODONE HYDROCHLORIDE 5 MG/1
5 TABLET ORAL
Refills: 0 | Status: DISCONTINUED | OUTPATIENT
Start: 2023-10-09 | End: 2023-10-11

## 2023-10-09 RX ORDER — OXYTOCIN 10 UNIT/ML
41.67 VIAL (ML) INJECTION
Qty: 20 | Refills: 0 | Status: DISCONTINUED | OUTPATIENT
Start: 2023-10-09 | End: 2023-10-11

## 2023-10-09 RX ADMIN — Medication 975 MILLIGRAM(S): at 16:03

## 2023-10-09 RX ADMIN — Medication 600 MILLIGRAM(S): at 12:17

## 2023-10-09 RX ADMIN — Medication 975 MILLIGRAM(S): at 10:10

## 2023-10-09 RX ADMIN — Medication 975 MILLIGRAM(S): at 20:48

## 2023-10-09 RX ADMIN — Medication 600 MILLIGRAM(S): at 13:00

## 2023-10-09 RX ADMIN — Medication 30 MILLIGRAM(S): at 06:04

## 2023-10-09 RX ADMIN — Medication 600 MILLIGRAM(S): at 18:16

## 2023-10-09 RX ADMIN — Medication 600 MILLIGRAM(S): at 23:50

## 2023-10-09 RX ADMIN — Medication 975 MILLIGRAM(S): at 15:17

## 2023-10-09 RX ADMIN — Medication 1 TABLET(S): at 12:18

## 2023-10-09 RX ADMIN — Medication 975 MILLIGRAM(S): at 09:20

## 2023-10-09 RX ADMIN — SENNA PLUS 1 TABLET(S): 8.6 TABLET ORAL at 22:36

## 2023-10-09 NOTE — OB RN DELIVERY SUMMARY - NSSELHIDDEN_OBGYN_ALL_OB_FT
[NS_DeliveryAttending1_OBGYN_ALL_OB_FT:[id number]],[NS_DeliveryAssist1_OBGYN_ALL_OB_FT:[id number]],[NS_DeliveryRN_OBGYN_ALL_OB_FT:[id number]==]

## 2023-10-09 NOTE — OB PROVIDER LABOR PROGRESS NOTE - ASSESSMENT
P0 38w5d in labor.   epidural, arom and on pitocin.   cat 1 FHT.   145/mod rima/no accel/no decel.   cont current mgmt.   discussed labor, vaginal delivery, operative delivery, and  delivery.   Alba HUTSON
A/P: 33y P0 admitted in labor   - c/w pitocin   - continuous monitoring/toco   - maternal repositioning/fluids prn for resuscitation   - epi for pain, top off prn     Kym Abrams PGY3
labor, AROM performed    -continue pitocin    Dea, PGY4  Seen w/ Dr. Oppenheim
P0 39w2d in active labor.   entering second stage.   instructed on pushing.   expecting .   Alba HUTSON

## 2023-10-09 NOTE — OB PROVIDER DELIVERY SUMMARY - NSPROVIDERDELIVERYNOTE_OBGYN_ALL_OB_FT
Spontaneous vaginal delivery of liveborn infant from OA position. Head, shoulders, and body delivered easily. Amniotic fluid was meconium stained and infant was suctioned. 1 minute delayed cord clamping was performed. Cord clamped and cut and infant passed to mother. Placenta delivered intact with a 3 vessel cord. Fundal massage was given and uterine fundus was found to be firm. Vaginal exam revealed an intact cervix, vaginal walls, and sulci. Patient had a 3rd degree laceration in the perineum. EAS was repaired in the usual fashion with 0.0 vicryl suture. The laceration was repaired with 2.0 vicryl rapide suture. Excellent hemostasis was noted. Patient was stable. Count was correct x2.     Jaylin Mcduffie  PGY-1 Spontaneous vaginal delivery of liveborn male infant from OA position with small RML cut. Head, shoulders, and body delivered easily. Amniotic fluid was thick meconium stained and infant was suctioned. 1 minute delayed cord clamping was performed. Cord clamped and cut and infant passed to mother. Placenta delivered intact with a 3 vessel cord. Fundal massage was given and uterine fundus was found to be firm. Vaginal exam revealed an intact cervix, vaginal walls, and sulci. Perineum inspected and a 3rd degree laceration was noted. EAS was repaired in the usual fashion with 0.0 vicryl suture. The vaginal part of laceration was repaired with 2.0 vicryl rapide suture. Excellent hemostasis was noted. Patient was stable. Count was correct x2.     Jaylin Mcduffie  PGY-1  Dict: [id number]

## 2023-10-09 NOTE — OB PROVIDER LABOR PROGRESS NOTE - NS_SUBJECTIVE/OBJECTIVE_OBGYN_ALL_OB_FT
PGY3 Labor Note    Patient seen and evaluated at bedside.  Reports increasing pressure    T(C): 36.8 (10-08-23 @ 23:26), Max: 37.1 (10-08-23 @ 11:23)  HR: 82 (10-09-23 @ 00:13) (58 - 109)  BP: 113/66 (10-09-23 @ 00:13) (89/55 - 121/76)  RR: 17 (10-08-23 @ 23:26) (17 - 18)  SpO2: 97% (10-09-23 @ 00:12) (92% - 100%)
P0 39w2d in labor.   feeling pressure.
Assessed pt for cervical change & AROM. AROM performed clear fluid.

## 2023-10-09 NOTE — OB PROVIDER DELIVERY SUMMARY - NSLOWPPHRISK_OBGYN_A_OB
Infectious Disease Note


Subjective


Subjective


Alert


Trach, vent FiO2 35 % PEEP 5


Now off levaphed 


Fever Tmax 100.2


less diarrhea 


TPN





ROS


ROS


unable to obtain





Vital Sign


Vital Signs





Vital Signs








  Date Time  Temp Pulse Resp B/P (MAP) Pulse Ox O2 Delivery O2 Flow Rate FiO2


 


4/14/20 07:37     100 Ventilator  


 


4/14/20 07:00 100.2 116 26 160/84 (109)    





 100.2       











Physical Exam


PHYSICAL EXAM


GENERAL: Alert but not responsive/agitated some . Feels warm -  has generalized 

anasarca 


HEENT: Pupils equal, + NGT, oral cavity dry 


NECK:  Trach/vent 


LUNGS: Diminished aeration 


HEART:  S1, S2, regular 


ABDOMEN:  Distended, hypoactive BS, Rectal tube- out


: Pepe  (3/17)


EXTREMITIES: Generalized edema, no cyanosis, SCDs bilaterally 


DERMATOLOGIC:  Warm and dry.  No generalized rash.  


CENTRAL NERVOUS SYSTEM: Opens eyes 


HDC, LIJ(4/6) and RUE-PICC without signs of complications





Labs


Lab





Laboratory Tests








Test


 4/13/20


14:11 4/13/20


15:00 4/13/20


18:26 4/14/20


01:07


 


Glucose (Fingerstick)


 151 mg/dL


(70-99) 


 213 mg/dL


(70-99) 174 mg/dL


(70-99)


 


O2 Saturation  94 % (92-99)   


 


Arterial Blood pH


 


 7.46


(7.35-7.45) 


 





 


Arterial Blood pCO2 at


Patient Temp 


 38 mmHg


(35-46) 


 





 


Arterial Blood pO2 at Patient


Temp 


 74 mmHg


() 


 





 


Arterial Blood HCO3


 


 26 mmol/L


(21-28) 


 





 


Arterial Blood Base Excess


 


 2 mmol/L


(-3-3) 


 





 


FiO2  35   


 


Test


 4/14/20


06:39 


 


 





 


Glucose (Fingerstick)


 157 mg/dL


(70-99) 


 


 











Micro


CT A/P, 4/9


IMPRESSION:


1. Increased ascites.


2. Persistent evidence of necrotizing pancreatitis with fluid and phlegmon


at the pancreas


3. Cholelithiasis with thickening of the gallbladder wall.


4. Persistent pleural effusions and atelectasis in the lung bases.





Objective


Assessment





No labs as PICC and IJ not drawing and peripheral stick also unsuccessful


Leukocytosis 4/10 -improved 


Fever -blood cults 4/10 neg.


Severe acute gallstone pancreatitis with necrosis


   -CT 4/9 necrotizing pancreatitis with fluid and phlegmon at the pancreas


   -not a surgical candidate at this time


Hypotension off levaphed 


JED requiring HD 


   - s/p RIJ temporary dialysis catheter replacement, 4/2


Vent dependent respiratory failure


   -s/p Trach placement 


   -lung opacities, COVID-19 neg 


Anasarca - worse


Anemia - S/p PRBC 3/26


Hypocalcemia 


Prediabetes


HTN


Diarrhea, C. diff neg 3/23


Anemia - S/p PRBCs





Plan


Plan of Care





With fever get Blood cults


Change lines and pepe today


CT Chest/abd/pelvis


cont merrem (4/8) and Zyvox (4/10) and micafungin


Add Flagyl


-previously on cefepime, flagyl & dapto 


Gen surgery following


f/u BC from 4/9 neg to date 


Maintain aspiration precautions 


Anemia deferred to primary





Critically ill





D/w Dr. Stephens


D/w nursing











RASHAWN ROSEN MD              Apr 14, 2020 07:47 No previous uterine incision/[facility] Pregnancy/Less than or equal to 4 previous vaginal births/No known bleeding disorder/No history of postpartum hemorrhage

## 2023-10-09 NOTE — OB PROVIDER DELIVERY SUMMARY - NSSELHIDDEN_OBGYN_ALL_OB_FT
[NS_DeliveryAttending1_OBGYN_ALL_OB_FT:[id number]],[NS_DeliveryAssist1_OBGYN_ALL_OB_FT:[id number]]

## 2023-10-09 NOTE — OB RN DELIVERY SUMMARY - NS_SEPSISRSKCALC_OBGYN_ALL_OB_FT
EOS calculated successfully. EOS Risk Factor: 0.5/1000 live births ([facility] national incidence); GA=38w6d; Temp=99; ROM=5.15; GBS='Negative'; Antibiotics='No antibiotics or any antibiotics < 2 hrs prior to birth'

## 2023-10-10 ENCOUNTER — TRANSCRIPTION ENCOUNTER (OUTPATIENT)
Age: 33
End: 2023-10-10

## 2023-10-10 DIAGNOSIS — D62 ACUTE POSTHEMORRHAGIC ANEMIA: ICD-10-CM

## 2023-10-10 LAB
HCT VFR BLD CALC: 27.7 % — LOW (ref 34.5–45)
HGB BLD-MCNC: 9.2 G/DL — LOW (ref 11.5–15.5)
MCHC RBC-ENTMCNC: 31.3 PG — SIGNIFICANT CHANGE UP (ref 27–34)
MCHC RBC-ENTMCNC: 33.2 GM/DL — SIGNIFICANT CHANGE UP (ref 32–36)
MCV RBC AUTO: 94.2 FL — SIGNIFICANT CHANGE UP (ref 80–100)
NRBC # BLD: 0 /100 WBCS — SIGNIFICANT CHANGE UP (ref 0–0)
PLATELET # BLD AUTO: 178 K/UL — SIGNIFICANT CHANGE UP (ref 150–400)
RBC # BLD: 2.94 M/UL — LOW (ref 3.8–5.2)
RBC # FLD: 13.8 % — SIGNIFICANT CHANGE UP (ref 10.3–14.5)
WBC # BLD: 12.19 K/UL — HIGH (ref 3.8–10.5)
WBC # FLD AUTO: 12.19 K/UL — HIGH (ref 3.8–10.5)

## 2023-10-10 RX ORDER — ASCORBIC ACID 60 MG
500 TABLET,CHEWABLE ORAL THREE TIMES A DAY
Refills: 0 | Status: DISCONTINUED | OUTPATIENT
Start: 2023-10-10 | End: 2023-10-11

## 2023-10-10 RX ORDER — FERROUS SULFATE 325(65) MG
325 TABLET ORAL THREE TIMES A DAY
Refills: 0 | Status: DISCONTINUED | OUTPATIENT
Start: 2023-10-10 | End: 2023-10-11

## 2023-10-10 RX ORDER — ACETAMINOPHEN 500 MG
3 TABLET ORAL
Qty: 0 | Refills: 0 | DISCHARGE
Start: 2023-10-10

## 2023-10-10 RX ORDER — IBUPROFEN 200 MG
1 TABLET ORAL
Qty: 0 | Refills: 0 | DISCHARGE
Start: 2023-10-10

## 2023-10-10 RX ADMIN — Medication 500 MILLIGRAM(S): at 13:10

## 2023-10-10 RX ADMIN — Medication 325 MILLIGRAM(S): at 13:09

## 2023-10-10 RX ADMIN — Medication 975 MILLIGRAM(S): at 15:02

## 2023-10-10 RX ADMIN — Medication 975 MILLIGRAM(S): at 22:30

## 2023-10-10 RX ADMIN — Medication 600 MILLIGRAM(S): at 18:16

## 2023-10-10 RX ADMIN — Medication 975 MILLIGRAM(S): at 15:45

## 2023-10-10 RX ADMIN — Medication 600 MILLIGRAM(S): at 12:01

## 2023-10-10 RX ADMIN — Medication 600 MILLIGRAM(S): at 12:50

## 2023-10-10 RX ADMIN — Medication 1 TABLET(S): at 12:02

## 2023-10-10 RX ADMIN — Medication 975 MILLIGRAM(S): at 09:40

## 2023-10-10 RX ADMIN — Medication 975 MILLIGRAM(S): at 02:18

## 2023-10-10 RX ADMIN — Medication 600 MILLIGRAM(S): at 06:26

## 2023-10-10 RX ADMIN — Medication 975 MILLIGRAM(S): at 08:51

## 2023-10-10 RX ADMIN — Medication 600 MILLIGRAM(S): at 18:50

## 2023-10-10 NOTE — DISCHARGE NOTE OB - CARE PLAN
1 Principal Discharge DX:	Vaginal delivery  Assessment and plan of treatment:	pelvic rest, limit activity, regular diet   Principal Discharge DX:	Vaginal delivery  Assessment and plan of treatment:	Take tylenol and motrin as directed, alternating every 3 hours.   Continue iron and prenatal vitamin daily. Take colace and mylicon as needed for constipation and gas pain.   Nothing in the vagina and no exercise until 6 week visit. You may continue bleeding for several weeks.  Follow up in the office in 6 weeks for full postpartum visit.   Call the office for appointment confirmation and with any concerns, including breast infection, wound infection, postpartum depression, high blood pressure, and painful calves.

## 2023-10-10 NOTE — PROGRESS NOTE ADULT - SUBJECTIVE AND OBJECTIVE BOX
Postpartum Note- PPD#1    Allergies: No Known Allergies    Blood type: O positive  Rubella: Non-immune  RPR: Negative     S: Patient is a 33y  PPD#1 s/p  with an EBL of 500cc.  Patient w/o complaints, pain is controlled.    Pt is OOB, tolerating PO, voiding and passing flatus. Lochia WNL.     O:  Vital Signs Last 24 Hrs  T(C): 36.8 (10 Oct 2023 06:24), Max: 36.9 (09 Oct 2023 11:51)  T(F): 98.2 (10 Oct 2023 06:24), Max: 98.4 (09 Oct 2023 11:51)  HR: 81 (10 Oct 2023 06:24) (79 - 85)  BP: 98/64 (10 Oct 2023 06:24) (97/61 - 110/67)  BP(mean): --  RR: 18 (10 Oct 2023 06:24) (17 - 18)  SpO2: 98% (10 Oct 2023 06:24) (95% - 98%)    Parameters below as of 10 Oct 2023 06:24  Patient On (Oxygen Delivery Method): room air      Gen: NAD  Abdomen: Soft, nontender, non-distended, fundus firm.  Lochia WNL  Ext: Neg edema, Neg calf tenderness    LABS:  Hemoglobin: 9.2 g/dL (10-10-23 @ 06:21)  Hemoglobin: 11.6 g/dL (10-08-23 @ 12:28)  Hematocrit: 27.7 % (10-10-23 @ 06:21)  Hematocrit: 33.8 % (10-08-23 @ 12:28)      A/P:  33y PPD#1 s/p , doing well.       PMHx: Denies  Current Issues: AM CBC shows mild anemia due to acute blood loss-pt asx and VSS    Increase OOB  Regular diet  Start Iron/Vitamin C  PO Pain protocol  Continue routine post partum care    Gayle Lozano PA-C

## 2023-10-10 NOTE — DISCHARGE NOTE OB - PATIENT PORTAL LINK FT
You can access the FollowMyHealth Patient Portal offered by [facility] by registering at the following website: [email or web address]. By joining [facility]’s FollowMyHealth portal, you will also be able to view your health information using other applications (apps) compatible with our system.

## 2023-10-10 NOTE — PROGRESS NOTE ADULT - ASSESSMENT
33y PPD#1 s/p , doing well.    33y PPD#1 s/p , doing well.       pt seen and eval by me   doing well   for dc home

## 2023-10-10 NOTE — DISCHARGE NOTE OB - PLAN OF CARE
pelvic rest, limit activity, regular diet Take tylenol and motrin as directed, alternating every 3 hours.   Continue iron and prenatal vitamin daily. Take colace and mylicon as needed for constipation and gas pain.   Nothing in the vagina and no exercise until 6 week visit. You may continue bleeding for several weeks.  Follow up in the office in 6 weeks for full postpartum visit.   Call the office for appointment confirmation and with any concerns, including breast infection, wound infection, postpartum depression, high blood pressure, and painful calves.

## 2023-10-10 NOTE — DISCHARGE NOTE OB - PROCEDURE SELECTOR
1
53 years old male with h/o HTN, HLD, ETOH multipl ed presentations for alcoholic gastritis and etoh withdrawal presenting for abdominal pain. says he has had upper abndominal pain for 2 days and is unable to eat or drink. regular BMs. occasional nonbloody comiting.

## 2023-10-10 NOTE — DISCHARGE NOTE OB - CARE PROVIDER_API CALL
Martha Carrasco  Obstetrics and Gynecology  [address] [facility], [address] [place] [address]  Phone: [phone]  Fax: [phone]  Follow Up Time:

## 2023-10-10 NOTE — DISCHARGE NOTE OB - MEDICATION SUMMARY - MEDICATIONS TO TAKE
I will START or STAY ON the medications listed below when I get home from the hospital:    ibuprofen 600 mg oral tablet  -- 1 tab(s) by mouth every 6 hours  -- Indication: For  (normal spontaneous vaginal delivery)    acetaminophen 325 mg oral tablet  -- 3 tab(s) by mouth 3 times a day  -- Indication: For  (normal spontaneous vaginal delivery)    Prenatal Multivitamins with Folic Acid 1 mg oral tablet  -- 1 tab(s) by mouth once a day  -- Indication: For  (normal spontaneous vaginal delivery)

## 2023-10-10 NOTE — DISCHARGE NOTE OB - MATERIALS PROVIDED
Vaccinations/Breastfeeding Log/Bottle Feeding Log/Guide to Postpartum Care/[facility] Hearing Screen Program/Shaken Baby Prevention Handout/Breastfeeding Guide and Packet

## 2023-10-10 NOTE — DISCHARGE NOTE OB - AFTER URINATION AND/OR BOWEL MOVEMENT, CLEAN WITH WARM WATER USING A PERI- BOTTLE, THEN PAT DRY WITH TOILET TISSUE
EXAM DATE/TIME:  01/01/2017 10:26 

 

[facility] COMPARISON:     

CT ABDOMEN & PELVIS W CONTRAST, December 29, 2016, 21:03.

        

 

 

INDICATIONS :     

Evaluate portal blood flow/ acute hepatocellular injury.

                     

 

MEDICAL HISTORY :     

Myocardial infarction.     Cerebrovascular accident.  GI disorder.  Seizure.

 

SURGICAL HISTORY :     

Appendectomy. Cholecystectomy. Hysterectomy. Gastrectomy.  Hernia repair.  Tonsillectomy.

 

ENCOUNTER:     

Initial

 

ACUITY:     

2 days

 

PAIN SCORE:     

6/10

 

LOCATION:     

Bilateral upper quadrant 

                     

MEASUREMENTS:     

 

LIVER:     

16.3 cm length 

 

COMMON DUCT:     

6 mm

 

RIGHT KIDNEY:     

Surgically absent. 

 

SPLEEN:     

11.3 cm length

 

FINDINGS:     

 

LIVER:     

Mild increased echotexture without focal lesion or ductal dilatation.  Hepatopedal flow in the main p
ortal vein with a velocity of 16 .3 cm/s.

 

COMMON DUCT:     

No intraluminal mass or stone visualized.  

 

GALLBLADDER:     

Cholecystectomy

 

PANCREAS:     

The visualized portions are within normal limits.  

 

RIGHT KIDNEY:     

Nephrectomy.

 

SPLEEN:     

No focal lesion.  Left pleural effusion.

 

CONCLUSION:     

1. The main portal vein is patent with hepatopedal flow.

2. Left pleural effusion.

3. Mild echogenic liver parenchyma.

 

 

 

 Avni Brewer MD on January 01, 2017 at 12:20           

Board Certified Radiologist.

 This report was verified electronically. Statement Selected

## 2023-10-11 VITALS
HEART RATE: 64 BPM | TEMPERATURE: 98 F | RESPIRATION RATE: 18 BRPM | OXYGEN SATURATION: 95 % | SYSTOLIC BLOOD PRESSURE: 99 MMHG | DIASTOLIC BLOOD PRESSURE: 64 MMHG

## 2023-10-11 PROCEDURE — 85025 COMPLETE CBC W/AUTO DIFF WBC: CPT

## 2023-10-11 PROCEDURE — 59050 FETAL MONITOR W/REPORT: CPT

## 2023-10-11 PROCEDURE — 86901 BLOOD TYPING SEROLOGIC RH(D): CPT

## 2023-10-11 PROCEDURE — 86850 RBC ANTIBODY SCREEN: CPT

## 2023-10-11 PROCEDURE — 86780 TREPONEMA PALLIDUM: CPT

## 2023-10-11 PROCEDURE — 85027 COMPLETE CBC AUTOMATED: CPT

## 2023-10-11 PROCEDURE — 90707 MMR VACCINE SC: CPT

## 2023-10-11 PROCEDURE — 86900 BLOOD TYPING SEROLOGIC ABO: CPT

## 2023-10-11 PROCEDURE — 36415 COLL VENOUS BLD VENIPUNCTURE: CPT

## 2023-10-11 RX ADMIN — Medication 600 MILLIGRAM(S): at 11:22

## 2023-10-11 RX ADMIN — Medication 500 MILLIGRAM(S): at 05:24

## 2023-10-11 RX ADMIN — Medication 1 TABLET(S): at 11:21

## 2023-10-11 RX ADMIN — Medication 975 MILLIGRAM(S): at 15:38

## 2023-10-11 RX ADMIN — Medication 975 MILLIGRAM(S): at 10:30

## 2023-10-11 RX ADMIN — Medication 975 MILLIGRAM(S): at 16:23

## 2023-10-11 RX ADMIN — Medication 600 MILLIGRAM(S): at 01:00

## 2023-10-11 RX ADMIN — Medication 600 MILLIGRAM(S): at 00:16

## 2023-10-11 RX ADMIN — Medication 325 MILLIGRAM(S): at 05:23

## 2023-10-11 RX ADMIN — Medication 325 MILLIGRAM(S): at 13:44

## 2023-10-11 RX ADMIN — Medication 600 MILLIGRAM(S): at 06:15

## 2023-10-11 RX ADMIN — Medication 600 MILLIGRAM(S): at 05:23

## 2023-10-11 RX ADMIN — MAGNESIUM HYDROXIDE 30 MILLILITER(S): 400 TABLET, CHEWABLE ORAL at 09:48

## 2023-10-11 RX ADMIN — Medication 975 MILLIGRAM(S): at 09:34

## 2023-10-11 RX ADMIN — Medication 600 MILLIGRAM(S): at 12:20

## 2023-10-11 RX ADMIN — Medication 500 MILLIGRAM(S): at 13:44

## 2023-10-11 RX ADMIN — Medication 0.5 MILLILITER(S): at 13:43

## 2023-10-11 RX ADMIN — Medication 975 MILLIGRAM(S): at 03:35

## 2023-10-11 RX ADMIN — Medication 975 MILLIGRAM(S): at 02:37

## 2023-10-31 ENCOUNTER — INPATIENT (INPATIENT)
Facility: HOSPITAL | Age: 33
LOS: 3 days | Discharge: HOME | DRG: 776 | End: 2023-11-04
Attending: HOSPITALIST | Admitting: HOSPITALIST
Payer: MEDICAID

## 2023-10-31 VITALS
DIASTOLIC BLOOD PRESSURE: 75 MMHG | SYSTOLIC BLOOD PRESSURE: 115 MMHG | HEIGHT: 62 IN | RESPIRATION RATE: 16 BRPM | HEART RATE: 113 BPM | OXYGEN SATURATION: 97 % | TEMPERATURE: 100 F | WEIGHT: 134.92 LBS

## 2023-10-31 DIAGNOSIS — Z98.890 OTHER SPECIFIED POSTPROCEDURAL STATES: Chronic | ICD-10-CM

## 2023-10-31 LAB
ALBUMIN SERPL ELPH-MCNC: 4 G/DL — SIGNIFICANT CHANGE UP (ref 3.3–5)
ALBUMIN SERPL ELPH-MCNC: 4 G/DL — SIGNIFICANT CHANGE UP (ref 3.3–5)
ALP SERPL-CCNC: 76 U/L — SIGNIFICANT CHANGE UP (ref 40–120)
ALP SERPL-CCNC: 76 U/L — SIGNIFICANT CHANGE UP (ref 40–120)
ALT FLD-CCNC: 14 U/L — SIGNIFICANT CHANGE UP (ref 10–45)
ALT FLD-CCNC: 14 U/L — SIGNIFICANT CHANGE UP (ref 10–45)
ANION GAP SERPL CALC-SCNC: 13 MMOL/L — SIGNIFICANT CHANGE UP (ref 5–17)
ANION GAP SERPL CALC-SCNC: 13 MMOL/L — SIGNIFICANT CHANGE UP (ref 5–17)
APPEARANCE UR: CLEAR — SIGNIFICANT CHANGE UP
APPEARANCE UR: CLEAR — SIGNIFICANT CHANGE UP
AST SERPL-CCNC: 22 U/L — SIGNIFICANT CHANGE UP (ref 10–40)
AST SERPL-CCNC: 22 U/L — SIGNIFICANT CHANGE UP (ref 10–40)
BACTERIA # UR AUTO: NEGATIVE /HPF — SIGNIFICANT CHANGE UP
BACTERIA # UR AUTO: NEGATIVE /HPF — SIGNIFICANT CHANGE UP
BASE EXCESS BLDV CALC-SCNC: 0.1 MMOL/L — SIGNIFICANT CHANGE UP (ref -2–3)
BASE EXCESS BLDV CALC-SCNC: 0.1 MMOL/L — SIGNIFICANT CHANGE UP (ref -2–3)
BASOPHILS # BLD AUTO: 0.03 K/UL — SIGNIFICANT CHANGE UP (ref 0–0.2)
BASOPHILS # BLD AUTO: 0.03 K/UL — SIGNIFICANT CHANGE UP (ref 0–0.2)
BASOPHILS NFR BLD AUTO: 0.3 % — SIGNIFICANT CHANGE UP (ref 0–2)
BASOPHILS NFR BLD AUTO: 0.3 % — SIGNIFICANT CHANGE UP (ref 0–2)
BILIRUB SERPL-MCNC: 0.5 MG/DL — SIGNIFICANT CHANGE UP (ref 0.2–1.2)
BILIRUB SERPL-MCNC: 0.5 MG/DL — SIGNIFICANT CHANGE UP (ref 0.2–1.2)
BILIRUB UR-MCNC: NEGATIVE — SIGNIFICANT CHANGE UP
BILIRUB UR-MCNC: NEGATIVE — SIGNIFICANT CHANGE UP
BUN SERPL-MCNC: 10 MG/DL — SIGNIFICANT CHANGE UP (ref 7–23)
BUN SERPL-MCNC: 10 MG/DL — SIGNIFICANT CHANGE UP (ref 7–23)
CA-I SERPL-SCNC: 1.16 MMOL/L — SIGNIFICANT CHANGE UP (ref 1.15–1.33)
CA-I SERPL-SCNC: 1.16 MMOL/L — SIGNIFICANT CHANGE UP (ref 1.15–1.33)
CALCIUM SERPL-MCNC: 9.1 MG/DL — SIGNIFICANT CHANGE UP (ref 8.4–10.5)
CALCIUM SERPL-MCNC: 9.1 MG/DL — SIGNIFICANT CHANGE UP (ref 8.4–10.5)
CAST: 2 /LPF — SIGNIFICANT CHANGE UP (ref 0–4)
CAST: 2 /LPF — SIGNIFICANT CHANGE UP (ref 0–4)
CHLORIDE BLDV-SCNC: 101 MMOL/L — SIGNIFICANT CHANGE UP (ref 96–108)
CHLORIDE BLDV-SCNC: 101 MMOL/L — SIGNIFICANT CHANGE UP (ref 96–108)
CHLORIDE SERPL-SCNC: 99 MMOL/L — SIGNIFICANT CHANGE UP (ref 96–108)
CHLORIDE SERPL-SCNC: 99 MMOL/L — SIGNIFICANT CHANGE UP (ref 96–108)
CO2 BLDV-SCNC: 27 MMOL/L — HIGH (ref 22–26)
CO2 BLDV-SCNC: 27 MMOL/L — HIGH (ref 22–26)
CO2 SERPL-SCNC: 22 MMOL/L — SIGNIFICANT CHANGE UP (ref 22–31)
CO2 SERPL-SCNC: 22 MMOL/L — SIGNIFICANT CHANGE UP (ref 22–31)
COLOR SPEC: YELLOW — SIGNIFICANT CHANGE UP
COLOR SPEC: YELLOW — SIGNIFICANT CHANGE UP
CREAT SERPL-MCNC: 0.72 MG/DL — SIGNIFICANT CHANGE UP (ref 0.5–1.3)
CREAT SERPL-MCNC: 0.72 MG/DL — SIGNIFICANT CHANGE UP (ref 0.5–1.3)
DIFF PNL FLD: ABNORMAL
DIFF PNL FLD: ABNORMAL
EGFR: 112 ML/MIN/1.73M2 — SIGNIFICANT CHANGE UP
EGFR: 112 ML/MIN/1.73M2 — SIGNIFICANT CHANGE UP
EOSINOPHIL # BLD AUTO: 0.01 K/UL — SIGNIFICANT CHANGE UP (ref 0–0.5)
EOSINOPHIL # BLD AUTO: 0.01 K/UL — SIGNIFICANT CHANGE UP (ref 0–0.5)
EOSINOPHIL NFR BLD AUTO: 0.1 % — SIGNIFICANT CHANGE UP (ref 0–6)
EOSINOPHIL NFR BLD AUTO: 0.1 % — SIGNIFICANT CHANGE UP (ref 0–6)
GAS PNL BLDV: 134 MMOL/L — LOW (ref 136–145)
GAS PNL BLDV: 134 MMOL/L — LOW (ref 136–145)
GAS PNL BLDV: SIGNIFICANT CHANGE UP
GLUCOSE BLDV-MCNC: 130 MG/DL — HIGH (ref 70–99)
GLUCOSE BLDV-MCNC: 130 MG/DL — HIGH (ref 70–99)
GLUCOSE SERPL-MCNC: 135 MG/DL — HIGH (ref 70–99)
GLUCOSE SERPL-MCNC: 135 MG/DL — HIGH (ref 70–99)
GLUCOSE UR QL: NEGATIVE MG/DL — SIGNIFICANT CHANGE UP
GLUCOSE UR QL: NEGATIVE MG/DL — SIGNIFICANT CHANGE UP
HCO3 BLDV-SCNC: 25 MMOL/L — SIGNIFICANT CHANGE UP (ref 22–29)
HCO3 BLDV-SCNC: 25 MMOL/L — SIGNIFICANT CHANGE UP (ref 22–29)
HCT VFR BLD CALC: 35.1 % — SIGNIFICANT CHANGE UP (ref 34.5–45)
HCT VFR BLD CALC: 35.1 % — SIGNIFICANT CHANGE UP (ref 34.5–45)
HCT VFR BLDA CALC: 35 % — SIGNIFICANT CHANGE UP (ref 34.5–46.5)
HCT VFR BLDA CALC: 35 % — SIGNIFICANT CHANGE UP (ref 34.5–46.5)
HGB BLD CALC-MCNC: 11.6 G/DL — LOW (ref 11.7–16.1)
HGB BLD CALC-MCNC: 11.6 G/DL — LOW (ref 11.7–16.1)
HGB BLD-MCNC: 11.5 G/DL — SIGNIFICANT CHANGE UP (ref 11.5–15.5)
HGB BLD-MCNC: 11.5 G/DL — SIGNIFICANT CHANGE UP (ref 11.5–15.5)
IMM GRANULOCYTES NFR BLD AUTO: 0.3 % — SIGNIFICANT CHANGE UP (ref 0–0.9)
IMM GRANULOCYTES NFR BLD AUTO: 0.3 % — SIGNIFICANT CHANGE UP (ref 0–0.9)
KETONES UR-MCNC: NEGATIVE MG/DL — SIGNIFICANT CHANGE UP
KETONES UR-MCNC: NEGATIVE MG/DL — SIGNIFICANT CHANGE UP
LACTATE BLDV-MCNC: 1.5 MMOL/L — SIGNIFICANT CHANGE UP (ref 0.5–2)
LACTATE BLDV-MCNC: 1.5 MMOL/L — SIGNIFICANT CHANGE UP (ref 0.5–2)
LEUKOCYTE ESTERASE UR-ACNC: ABNORMAL
LEUKOCYTE ESTERASE UR-ACNC: ABNORMAL
LYMPHOCYTES # BLD AUTO: 1.03 K/UL — SIGNIFICANT CHANGE UP (ref 1–3.3)
LYMPHOCYTES # BLD AUTO: 1.03 K/UL — SIGNIFICANT CHANGE UP (ref 1–3.3)
LYMPHOCYTES # BLD AUTO: 9 % — LOW (ref 13–44)
LYMPHOCYTES # BLD AUTO: 9 % — LOW (ref 13–44)
MCHC RBC-ENTMCNC: 30.9 PG — SIGNIFICANT CHANGE UP (ref 27–34)
MCHC RBC-ENTMCNC: 30.9 PG — SIGNIFICANT CHANGE UP (ref 27–34)
MCHC RBC-ENTMCNC: 32.8 GM/DL — SIGNIFICANT CHANGE UP (ref 32–36)
MCHC RBC-ENTMCNC: 32.8 GM/DL — SIGNIFICANT CHANGE UP (ref 32–36)
MCV RBC AUTO: 94.4 FL — SIGNIFICANT CHANGE UP (ref 80–100)
MCV RBC AUTO: 94.4 FL — SIGNIFICANT CHANGE UP (ref 80–100)
MONOCYTES # BLD AUTO: 0.55 K/UL — SIGNIFICANT CHANGE UP (ref 0–0.9)
MONOCYTES # BLD AUTO: 0.55 K/UL — SIGNIFICANT CHANGE UP (ref 0–0.9)
MONOCYTES NFR BLD AUTO: 4.8 % — SIGNIFICANT CHANGE UP (ref 2–14)
MONOCYTES NFR BLD AUTO: 4.8 % — SIGNIFICANT CHANGE UP (ref 2–14)
NEUTROPHILS # BLD AUTO: 9.84 K/UL — HIGH (ref 1.8–7.4)
NEUTROPHILS # BLD AUTO: 9.84 K/UL — HIGH (ref 1.8–7.4)
NEUTROPHILS NFR BLD AUTO: 85.5 % — HIGH (ref 43–77)
NEUTROPHILS NFR BLD AUTO: 85.5 % — HIGH (ref 43–77)
NITRITE UR-MCNC: NEGATIVE — SIGNIFICANT CHANGE UP
NITRITE UR-MCNC: NEGATIVE — SIGNIFICANT CHANGE UP
NRBC # BLD: 0 /100 WBCS — SIGNIFICANT CHANGE UP (ref 0–0)
NRBC # BLD: 0 /100 WBCS — SIGNIFICANT CHANGE UP (ref 0–0)
PCO2 BLDV: 43 MMHG — HIGH (ref 39–42)
PCO2 BLDV: 43 MMHG — HIGH (ref 39–42)
PH BLDV: 7.38 — SIGNIFICANT CHANGE UP (ref 7.32–7.43)
PH BLDV: 7.38 — SIGNIFICANT CHANGE UP (ref 7.32–7.43)
PH UR: 6.5 — SIGNIFICANT CHANGE UP (ref 5–8)
PH UR: 6.5 — SIGNIFICANT CHANGE UP (ref 5–8)
PLATELET # BLD AUTO: 236 K/UL — SIGNIFICANT CHANGE UP (ref 150–400)
PLATELET # BLD AUTO: 236 K/UL — SIGNIFICANT CHANGE UP (ref 150–400)
PO2 BLDV: 17 MMHG — LOW (ref 25–45)
PO2 BLDV: 17 MMHG — LOW (ref 25–45)
POTASSIUM BLDV-SCNC: 3.1 MMOL/L — LOW (ref 3.5–5.1)
POTASSIUM BLDV-SCNC: 3.1 MMOL/L — LOW (ref 3.5–5.1)
POTASSIUM SERPL-MCNC: 3.2 MMOL/L — LOW (ref 3.5–5.3)
POTASSIUM SERPL-MCNC: 3.2 MMOL/L — LOW (ref 3.5–5.3)
POTASSIUM SERPL-SCNC: 3.2 MMOL/L — LOW (ref 3.5–5.3)
POTASSIUM SERPL-SCNC: 3.2 MMOL/L — LOW (ref 3.5–5.3)
PROT SERPL-MCNC: 7.7 G/DL — SIGNIFICANT CHANGE UP (ref 6–8.3)
PROT SERPL-MCNC: 7.7 G/DL — SIGNIFICANT CHANGE UP (ref 6–8.3)
PROT UR-MCNC: SIGNIFICANT CHANGE UP MG/DL
PROT UR-MCNC: SIGNIFICANT CHANGE UP MG/DL
RBC # BLD: 3.72 M/UL — LOW (ref 3.8–5.2)
RBC # BLD: 3.72 M/UL — LOW (ref 3.8–5.2)
RBC # FLD: 13.3 % — SIGNIFICANT CHANGE UP (ref 10.3–14.5)
RBC # FLD: 13.3 % — SIGNIFICANT CHANGE UP (ref 10.3–14.5)
RBC CASTS # UR COMP ASSIST: 5 /HPF — HIGH (ref 0–4)
RBC CASTS # UR COMP ASSIST: 5 /HPF — HIGH (ref 0–4)
REVIEW: SIGNIFICANT CHANGE UP
REVIEW: SIGNIFICANT CHANGE UP
SAO2 % BLDV: 23.1 % — LOW (ref 67–88)
SAO2 % BLDV: 23.1 % — LOW (ref 67–88)
SODIUM SERPL-SCNC: 134 MMOL/L — LOW (ref 135–145)
SODIUM SERPL-SCNC: 134 MMOL/L — LOW (ref 135–145)
SP GR SPEC: 1 — SIGNIFICANT CHANGE UP (ref 1–1.03)
SP GR SPEC: 1 — SIGNIFICANT CHANGE UP (ref 1–1.03)
SQUAMOUS # UR AUTO: 3 /HPF — SIGNIFICANT CHANGE UP (ref 0–5)
SQUAMOUS # UR AUTO: 3 /HPF — SIGNIFICANT CHANGE UP (ref 0–5)
UROBILINOGEN FLD QL: 0.2 MG/DL — SIGNIFICANT CHANGE UP (ref 0.2–1)
UROBILINOGEN FLD QL: 0.2 MG/DL — SIGNIFICANT CHANGE UP (ref 0.2–1)
WBC # BLD: 11.5 K/UL — HIGH (ref 3.8–10.5)
WBC # BLD: 11.5 K/UL — HIGH (ref 3.8–10.5)
WBC # FLD AUTO: 11.5 K/UL — HIGH (ref 3.8–10.5)
WBC # FLD AUTO: 11.5 K/UL — HIGH (ref 3.8–10.5)
WBC UR QL: 33 /HPF — HIGH (ref 0–5)
WBC UR QL: 33 /HPF — HIGH (ref 0–5)

## 2023-10-31 PROCEDURE — 99285 EMERGENCY DEPT VISIT HI MDM: CPT

## 2023-10-31 RX ORDER — SODIUM,POTASSIUM PHOSPHATES 278-250MG
1 POWDER IN PACKET (EA) ORAL ONCE
Refills: 0 | Status: COMPLETED | OUTPATIENT
Start: 2023-10-31 | End: 2023-10-31

## 2023-10-31 RX ORDER — CEFTRIAXONE 500 MG/1
1000 INJECTION, POWDER, FOR SOLUTION INTRAMUSCULAR; INTRAVENOUS ONCE
Refills: 0 | Status: COMPLETED | OUTPATIENT
Start: 2023-10-31 | End: 2023-10-31

## 2023-10-31 RX ORDER — SODIUM CHLORIDE 9 MG/ML
1000 INJECTION, SOLUTION INTRAVENOUS ONCE
Refills: 0 | Status: COMPLETED | OUTPATIENT
Start: 2023-10-31 | End: 2023-10-31

## 2023-10-31 RX ORDER — ACETAMINOPHEN 500 MG
1000 TABLET ORAL ONCE
Refills: 0 | Status: COMPLETED | OUTPATIENT
Start: 2023-10-31 | End: 2023-10-31

## 2023-10-31 RX ORDER — IBUPROFEN 200 MG
600 TABLET ORAL ONCE
Refills: 0 | Status: COMPLETED | OUTPATIENT
Start: 2023-10-31 | End: 2023-10-31

## 2023-10-31 RX ADMIN — Medication 400 MILLIGRAM(S): at 23:33

## 2023-10-31 RX ADMIN — CEFTRIAXONE 100 MILLIGRAM(S): 500 INJECTION, POWDER, FOR SOLUTION INTRAMUSCULAR; INTRAVENOUS at 21:57

## 2023-10-31 RX ADMIN — SODIUM CHLORIDE 1000 MILLILITER(S): 9 INJECTION, SOLUTION INTRAVENOUS at 21:56

## 2023-10-31 RX ADMIN — Medication 600 MILLIGRAM(S): at 21:57

## 2023-10-31 RX ADMIN — Medication 1 PACKET(S): at 23:33

## 2023-10-31 NOTE — ED ADULT NURSE NOTE - OBJECTIVE STATEMENT
32 y/o female SP full term vaginal delivery 3 weeks ago presenting to ED for dysuria, fever at home, chills, and left sided flank pain. pt reports dysuria started approx 1 week ago, prescribed keflex outpatient. Upon exam pt A&Ox3 gross neuro intact,  no difficulty speaking in complete sentences, s1s2 heart sounds heard, pulses x 4, shaw x4, abdomen soft nontender nondistended, skin intact. pt denies chest pain, sob, ha, n/v/d, abdominal pain,  hematuria.

## 2023-10-31 NOTE — ED PROVIDER NOTE - ATTENDING APP SHARED VISIT CONTRIBUTION OF CARE
Attending MD ROLAN Palacio- This was a shared visit with KAI.  I have reviewed and discussed the case with the KAI and agree with verified documentation unless otherwise documented.  I have independently spoken with and examined the patient and my documentation of history/physical exam and MDM are as follows:    33 F with no PMH presenting 3 weeks after uncomplicated  (requiring repair for episiotomy/third-degree perineal laceration) presenting with urinary symptoms and fever x1 week, prescribed Keflex without improvement.  Now complaining of left flank pain.  No chest pain, shortness of breath, cough, vomiting, diarrhea, vaginal bleeding or discharge.  No pain with defecation.  On exam, patient no acute distress, skin hot to touch, left flank tenderness to palpation with +TTP, no suprapubic tenderness.  External pelvic exam performed by KEVIN Rosas and chaperoned by myself with out erythema or edema to perennial, no discharge noted at vaginal introitus.    MDM–concern for pyelonephritis, will obtain sepsis labs, administer Motrin, fluids, ceftriaxone.

## 2023-10-31 NOTE — ED PROVIDER NOTE - OBJECTIVE STATEMENT
33-year-old female with no reported significant past medical history, status post full-term  10/9, presents to ED complaining of dysuria, fever 103 at home, chills, and left-sided flank pain.  Dysuria started approximately 1 week ago.  Was seen by PMD today who prescribed Keflex and told patient to come to ER if fevers persisted.  Fevers temporarily improved with Tylenol but then return.  Endorsing mild nausea.  Denies vomiting, diarrhea. Both breast and formula feeding.

## 2023-10-31 NOTE — ED PROVIDER NOTE - PHYSICAL EXAMINATION
GEN: Pt fatigued in NAD, alert.  PSYCH: Affect and mood appropriate.  EYES: Sclera white w/o injection, EOMI, PERRLA.   ENT: MM dry. Neck supple. Airway patent.  RESP: CTA b/l, no wheezes, rales, or rhonchi.   CARDIAC: Tachy 110, clear distinct S1, S2, no appreciable murmurs.  ABD: Soft, +L flank ttp. +L CVAT.  MSK: OBRIEN. FROM throughout.  NEURO: No focal motor or sensory deficits.  VASC: Strong distal pulses. No edema. No calf tenderness.  SKIN: No rashes or lesions. GEN: Pt fatigued in NAD, alert.  PSYCH: Affect and mood appropriate.  EYES: Sclera white w/o injection, EOMI, PERRLA.   ENT: MM dry. Neck supple. Airway patent.  RESP: CTA b/l, no wheezes, rales, or rhonchi.   CARDIAC: Tachy 110, clear distinct S1, S2, no appreciable murmurs.  ABD: Soft, +L flank ttp, no rebound or guarding. +L CVAT.  MSK: OBRIEN. FROM throughout.  NEURO: No focal motor or sensory deficits.  VASC: Strong distal pulses. No edema. No calf tenderness.  SKIN: No rashes or lesions.

## 2023-10-31 NOTE — ED PROVIDER NOTE - PROGRESS NOTE DETAILS
Peewee Rosas PA-C: Physical exam and work-up consistent with pyelonephritis.  Patient endorsed to hospitalist for admission.  Spoke with OB/GYN service who will see patient in ED.

## 2023-11-01 DIAGNOSIS — N12 TUBULO-INTERSTITIAL NEPHRITIS, NOT SPECIFIED AS ACUTE OR CHRONIC: ICD-10-CM

## 2023-11-01 DIAGNOSIS — A41.9 SEPSIS, UNSPECIFIED ORGANISM: ICD-10-CM

## 2023-11-01 DIAGNOSIS — N39.0 URINARY TRACT INFECTION, SITE NOT SPECIFIED: ICD-10-CM

## 2023-11-01 LAB
A1C WITH ESTIMATED AVERAGE GLUCOSE RESULT: 5.3 % — SIGNIFICANT CHANGE UP (ref 4–5.6)
A1C WITH ESTIMATED AVERAGE GLUCOSE RESULT: 5.3 % — SIGNIFICANT CHANGE UP (ref 4–5.6)
ALBUMIN SERPL ELPH-MCNC: 3.6 G/DL — SIGNIFICANT CHANGE UP (ref 3.3–5)
ALBUMIN SERPL ELPH-MCNC: 3.6 G/DL — SIGNIFICANT CHANGE UP (ref 3.3–5)
ALP SERPL-CCNC: 71 U/L — SIGNIFICANT CHANGE UP (ref 40–120)
ALP SERPL-CCNC: 71 U/L — SIGNIFICANT CHANGE UP (ref 40–120)
ALT FLD-CCNC: 12 U/L — SIGNIFICANT CHANGE UP (ref 10–45)
ALT FLD-CCNC: 12 U/L — SIGNIFICANT CHANGE UP (ref 10–45)
ANION GAP SERPL CALC-SCNC: 11 MMOL/L — SIGNIFICANT CHANGE UP (ref 5–17)
ANION GAP SERPL CALC-SCNC: 11 MMOL/L — SIGNIFICANT CHANGE UP (ref 5–17)
APTT BLD: 29.3 SEC — SIGNIFICANT CHANGE UP (ref 24.5–35.6)
APTT BLD: 29.3 SEC — SIGNIFICANT CHANGE UP (ref 24.5–35.6)
AST SERPL-CCNC: 13 U/L — SIGNIFICANT CHANGE UP (ref 10–40)
AST SERPL-CCNC: 13 U/L — SIGNIFICANT CHANGE UP (ref 10–40)
BASOPHILS # BLD AUTO: 0.02 K/UL — SIGNIFICANT CHANGE UP (ref 0–0.2)
BASOPHILS # BLD AUTO: 0.02 K/UL — SIGNIFICANT CHANGE UP (ref 0–0.2)
BASOPHILS NFR BLD AUTO: 0.2 % — SIGNIFICANT CHANGE UP (ref 0–2)
BASOPHILS NFR BLD AUTO: 0.2 % — SIGNIFICANT CHANGE UP (ref 0–2)
BILIRUB SERPL-MCNC: 0.4 MG/DL — SIGNIFICANT CHANGE UP (ref 0.2–1.2)
BILIRUB SERPL-MCNC: 0.4 MG/DL — SIGNIFICANT CHANGE UP (ref 0.2–1.2)
BUN SERPL-MCNC: 9 MG/DL — SIGNIFICANT CHANGE UP (ref 7–23)
BUN SERPL-MCNC: 9 MG/DL — SIGNIFICANT CHANGE UP (ref 7–23)
CALCIUM SERPL-MCNC: 8.5 MG/DL — SIGNIFICANT CHANGE UP (ref 8.4–10.5)
CALCIUM SERPL-MCNC: 8.5 MG/DL — SIGNIFICANT CHANGE UP (ref 8.4–10.5)
CHLORIDE SERPL-SCNC: 105 MMOL/L — SIGNIFICANT CHANGE UP (ref 96–108)
CHLORIDE SERPL-SCNC: 105 MMOL/L — SIGNIFICANT CHANGE UP (ref 96–108)
CHOLEST SERPL-MCNC: 160 MG/DL — SIGNIFICANT CHANGE UP
CHOLEST SERPL-MCNC: 160 MG/DL — SIGNIFICANT CHANGE UP
CO2 SERPL-SCNC: 24 MMOL/L — SIGNIFICANT CHANGE UP (ref 22–31)
CO2 SERPL-SCNC: 24 MMOL/L — SIGNIFICANT CHANGE UP (ref 22–31)
CREAT SERPL-MCNC: 0.7 MG/DL — SIGNIFICANT CHANGE UP (ref 0.5–1.3)
CREAT SERPL-MCNC: 0.7 MG/DL — SIGNIFICANT CHANGE UP (ref 0.5–1.3)
EGFR: 116 ML/MIN/1.73M2 — SIGNIFICANT CHANGE UP
EGFR: 116 ML/MIN/1.73M2 — SIGNIFICANT CHANGE UP
EOSINOPHIL # BLD AUTO: 0.03 K/UL — SIGNIFICANT CHANGE UP (ref 0–0.5)
EOSINOPHIL # BLD AUTO: 0.03 K/UL — SIGNIFICANT CHANGE UP (ref 0–0.5)
EOSINOPHIL NFR BLD AUTO: 0.2 % — SIGNIFICANT CHANGE UP (ref 0–6)
EOSINOPHIL NFR BLD AUTO: 0.2 % — SIGNIFICANT CHANGE UP (ref 0–6)
ESTIMATED AVERAGE GLUCOSE: 105 MG/DL — SIGNIFICANT CHANGE UP (ref 68–114)
ESTIMATED AVERAGE GLUCOSE: 105 MG/DL — SIGNIFICANT CHANGE UP (ref 68–114)
GLUCOSE SERPL-MCNC: 111 MG/DL — HIGH (ref 70–99)
GLUCOSE SERPL-MCNC: 111 MG/DL — HIGH (ref 70–99)
HCT VFR BLD CALC: 32.5 % — LOW (ref 34.5–45)
HCT VFR BLD CALC: 32.5 % — LOW (ref 34.5–45)
HDLC SERPL-MCNC: 64 MG/DL — SIGNIFICANT CHANGE UP
HDLC SERPL-MCNC: 64 MG/DL — SIGNIFICANT CHANGE UP
HGB BLD-MCNC: 10.6 G/DL — LOW (ref 11.5–15.5)
HGB BLD-MCNC: 10.6 G/DL — LOW (ref 11.5–15.5)
IMM GRANULOCYTES NFR BLD AUTO: 0.5 % — SIGNIFICANT CHANGE UP (ref 0–0.9)
IMM GRANULOCYTES NFR BLD AUTO: 0.5 % — SIGNIFICANT CHANGE UP (ref 0–0.9)
INR BLD: 1.12 RATIO — SIGNIFICANT CHANGE UP (ref 0.85–1.18)
INR BLD: 1.12 RATIO — SIGNIFICANT CHANGE UP (ref 0.85–1.18)
LIPID PNL WITH DIRECT LDL SERPL: 82 MG/DL — SIGNIFICANT CHANGE UP
LIPID PNL WITH DIRECT LDL SERPL: 82 MG/DL — SIGNIFICANT CHANGE UP
LYMPHOCYTES # BLD AUTO: 1 K/UL — SIGNIFICANT CHANGE UP (ref 1–3.3)
LYMPHOCYTES # BLD AUTO: 1 K/UL — SIGNIFICANT CHANGE UP (ref 1–3.3)
LYMPHOCYTES # BLD AUTO: 8.2 % — LOW (ref 13–44)
LYMPHOCYTES # BLD AUTO: 8.2 % — LOW (ref 13–44)
MAGNESIUM SERPL-MCNC: 1.8 MG/DL — SIGNIFICANT CHANGE UP (ref 1.6–2.6)
MAGNESIUM SERPL-MCNC: 1.8 MG/DL — SIGNIFICANT CHANGE UP (ref 1.6–2.6)
MCHC RBC-ENTMCNC: 31.2 PG — SIGNIFICANT CHANGE UP (ref 27–34)
MCHC RBC-ENTMCNC: 31.2 PG — SIGNIFICANT CHANGE UP (ref 27–34)
MCHC RBC-ENTMCNC: 32.6 GM/DL — SIGNIFICANT CHANGE UP (ref 32–36)
MCHC RBC-ENTMCNC: 32.6 GM/DL — SIGNIFICANT CHANGE UP (ref 32–36)
MCV RBC AUTO: 95.6 FL — SIGNIFICANT CHANGE UP (ref 80–100)
MCV RBC AUTO: 95.6 FL — SIGNIFICANT CHANGE UP (ref 80–100)
MONOCYTES # BLD AUTO: 0.81 K/UL — SIGNIFICANT CHANGE UP (ref 0–0.9)
MONOCYTES # BLD AUTO: 0.81 K/UL — SIGNIFICANT CHANGE UP (ref 0–0.9)
MONOCYTES NFR BLD AUTO: 6.7 % — SIGNIFICANT CHANGE UP (ref 2–14)
MONOCYTES NFR BLD AUTO: 6.7 % — SIGNIFICANT CHANGE UP (ref 2–14)
NEUTROPHILS # BLD AUTO: 10.25 K/UL — HIGH (ref 1.8–7.4)
NEUTROPHILS # BLD AUTO: 10.25 K/UL — HIGH (ref 1.8–7.4)
NEUTROPHILS NFR BLD AUTO: 84.2 % — HIGH (ref 43–77)
NEUTROPHILS NFR BLD AUTO: 84.2 % — HIGH (ref 43–77)
NON HDL CHOLESTEROL: 96 MG/DL — SIGNIFICANT CHANGE UP
NON HDL CHOLESTEROL: 96 MG/DL — SIGNIFICANT CHANGE UP
NRBC # BLD: 0 /100 WBCS — SIGNIFICANT CHANGE UP (ref 0–0)
NRBC # BLD: 0 /100 WBCS — SIGNIFICANT CHANGE UP (ref 0–0)
PHOSPHATE SERPL-MCNC: 2.4 MG/DL — LOW (ref 2.5–4.5)
PHOSPHATE SERPL-MCNC: 2.4 MG/DL — LOW (ref 2.5–4.5)
PLATELET # BLD AUTO: 206 K/UL — SIGNIFICANT CHANGE UP (ref 150–400)
PLATELET # BLD AUTO: 206 K/UL — SIGNIFICANT CHANGE UP (ref 150–400)
POTASSIUM SERPL-MCNC: 3.6 MMOL/L — SIGNIFICANT CHANGE UP (ref 3.5–5.3)
POTASSIUM SERPL-MCNC: 3.6 MMOL/L — SIGNIFICANT CHANGE UP (ref 3.5–5.3)
POTASSIUM SERPL-SCNC: 3.6 MMOL/L — SIGNIFICANT CHANGE UP (ref 3.5–5.3)
POTASSIUM SERPL-SCNC: 3.6 MMOL/L — SIGNIFICANT CHANGE UP (ref 3.5–5.3)
PROT SERPL-MCNC: 6.5 G/DL — SIGNIFICANT CHANGE UP (ref 6–8.3)
PROT SERPL-MCNC: 6.5 G/DL — SIGNIFICANT CHANGE UP (ref 6–8.3)
PROTHROM AB SERPL-ACNC: 11.7 SEC — SIGNIFICANT CHANGE UP (ref 9.5–13)
PROTHROM AB SERPL-ACNC: 11.7 SEC — SIGNIFICANT CHANGE UP (ref 9.5–13)
RBC # BLD: 3.4 M/UL — LOW (ref 3.8–5.2)
RBC # BLD: 3.4 M/UL — LOW (ref 3.8–5.2)
RBC # FLD: 13.4 % — SIGNIFICANT CHANGE UP (ref 10.3–14.5)
RBC # FLD: 13.4 % — SIGNIFICANT CHANGE UP (ref 10.3–14.5)
SODIUM SERPL-SCNC: 140 MMOL/L — SIGNIFICANT CHANGE UP (ref 135–145)
SODIUM SERPL-SCNC: 140 MMOL/L — SIGNIFICANT CHANGE UP (ref 135–145)
TRIGL SERPL-MCNC: 72 MG/DL — SIGNIFICANT CHANGE UP
TRIGL SERPL-MCNC: 72 MG/DL — SIGNIFICANT CHANGE UP
TSH SERPL-MCNC: 0.7 UIU/ML — SIGNIFICANT CHANGE UP (ref 0.27–4.2)
TSH SERPL-MCNC: 0.7 UIU/ML — SIGNIFICANT CHANGE UP (ref 0.27–4.2)
WBC # BLD: 12.17 K/UL — HIGH (ref 3.8–10.5)
WBC # BLD: 12.17 K/UL — HIGH (ref 3.8–10.5)
WBC # FLD AUTO: 12.17 K/UL — HIGH (ref 3.8–10.5)
WBC # FLD AUTO: 12.17 K/UL — HIGH (ref 3.8–10.5)

## 2023-11-01 PROCEDURE — 99254 IP/OBS CNSLTJ NEW/EST MOD 60: CPT

## 2023-11-01 PROCEDURE — 76770 US EXAM ABDO BACK WALL COMP: CPT | Mod: 26

## 2023-11-01 PROCEDURE — 99223 1ST HOSP IP/OBS HIGH 75: CPT

## 2023-11-01 RX ORDER — SODIUM CHLORIDE 9 MG/ML
1000 INJECTION INTRAMUSCULAR; INTRAVENOUS; SUBCUTANEOUS
Refills: 0 | Status: DISCONTINUED | OUTPATIENT
Start: 2023-11-01 | End: 2023-11-04

## 2023-11-01 RX ORDER — LANOLIN ALCOHOL/MO/W.PET/CERES
3 CREAM (GRAM) TOPICAL AT BEDTIME
Refills: 0 | Status: DISCONTINUED | OUTPATIENT
Start: 2023-11-01 | End: 2023-11-04

## 2023-11-01 RX ORDER — CEFTRIAXONE 500 MG/1
1000 INJECTION, POWDER, FOR SOLUTION INTRAMUSCULAR; INTRAVENOUS EVERY 24 HOURS
Refills: 0 | Status: DISCONTINUED | OUTPATIENT
Start: 2023-11-01 | End: 2023-11-04

## 2023-11-01 RX ORDER — ACETAMINOPHEN 500 MG
1000 TABLET ORAL ONCE
Refills: 0 | Status: COMPLETED | OUTPATIENT
Start: 2023-11-01 | End: 2023-11-01

## 2023-11-01 RX ORDER — KETOROLAC TROMETHAMINE 30 MG/ML
15 SYRINGE (ML) INJECTION ONCE
Refills: 0 | Status: DISCONTINUED | OUTPATIENT
Start: 2023-11-01 | End: 2023-11-01

## 2023-11-01 RX ORDER — POTASSIUM CHLORIDE 20 MEQ
10 PACKET (EA) ORAL
Refills: 0 | Status: COMPLETED | OUTPATIENT
Start: 2023-11-01 | End: 2023-11-01

## 2023-11-01 RX ORDER — SODIUM,POTASSIUM PHOSPHATES 278-250MG
1 POWDER IN PACKET (EA) ORAL ONCE
Refills: 0 | Status: COMPLETED | OUTPATIENT
Start: 2023-11-01 | End: 2023-11-01

## 2023-11-01 RX ORDER — ONDANSETRON 8 MG/1
4 TABLET, FILM COATED ORAL EVERY 8 HOURS
Refills: 0 | Status: DISCONTINUED | OUTPATIENT
Start: 2023-11-01 | End: 2023-11-04

## 2023-11-01 RX ORDER — ACETAMINOPHEN 500 MG
650 TABLET ORAL EVERY 6 HOURS
Refills: 0 | Status: DISCONTINUED | OUTPATIENT
Start: 2023-11-01 | End: 2023-11-04

## 2023-11-01 RX ORDER — ENOXAPARIN SODIUM 100 MG/ML
40 INJECTION SUBCUTANEOUS EVERY 24 HOURS
Refills: 0 | Status: DISCONTINUED | OUTPATIENT
Start: 2023-11-01 | End: 2023-11-04

## 2023-11-01 RX ADMIN — Medication 600 MILLIGRAM(S): at 07:02

## 2023-11-01 RX ADMIN — Medication 650 MILLIGRAM(S): at 15:42

## 2023-11-01 RX ADMIN — ONDANSETRON 4 MILLIGRAM(S): 8 TABLET, FILM COATED ORAL at 06:23

## 2023-11-01 RX ADMIN — Medication 1 PACKET(S): at 17:27

## 2023-11-01 RX ADMIN — Medication 15 MILLIGRAM(S): at 17:27

## 2023-11-01 RX ADMIN — Medication 100 MILLIEQUIVALENT(S): at 00:30

## 2023-11-01 RX ADMIN — Medication 400 MILLIGRAM(S): at 10:49

## 2023-11-01 RX ADMIN — CEFTRIAXONE 100 MILLIGRAM(S): 500 INJECTION, POWDER, FOR SOLUTION INTRAMUSCULAR; INTRAVENOUS at 20:31

## 2023-11-01 RX ADMIN — Medication 100 MILLIEQUIVALENT(S): at 03:47

## 2023-11-01 RX ADMIN — SODIUM CHLORIDE 85 MILLILITER(S): 9 INJECTION INTRAMUSCULAR; INTRAVENOUS; SUBCUTANEOUS at 10:49

## 2023-11-01 RX ADMIN — Medication 1000 MILLIGRAM(S): at 07:02

## 2023-11-01 RX ADMIN — SODIUM CHLORIDE 85 MILLILITER(S): 9 INJECTION INTRAMUSCULAR; INTRAVENOUS; SUBCUTANEOUS at 00:30

## 2023-11-01 RX ADMIN — Medication 100 MILLIEQUIVALENT(S): at 06:22

## 2023-11-01 RX ADMIN — Medication 1000 MILLIGRAM(S): at 11:19

## 2023-11-01 NOTE — CHART NOTE - NSCHARTNOTEFT_GEN_A_CORE
Patient admitted for sepsis 2' acute cystitis, with concern for possible endometritis  - per gyn, no clinical signs of endometritis  - c/w CTX 1 gm daily for cystitis  - urine culture pending  - ID consult pending    Kimberly Calix D.O.  Division of Hospital Medicine  Available on MS Teams Patient admitted for sepsis 2' acute cystitis, with concern for possible endometritis  - per gyn, no clinical signs of endometritis  - c/w CTX 1 gm daily for cystitis  - urine culture pending  - ID consult pending    discussed with  at bedside    Kimberly Calix D.O.  Division of Hospital Medicine  Available on MS Teams

## 2023-11-01 NOTE — CONSULT NOTE ADULT - SUBJECTIVE AND OBJECTIVE BOX
HPI:    32 yo f, recent spontaneous vaginal delivery 10/9/23, with no known pmh presented on 11/1 for fever, dysuria, left flank pain; Dysuria started approximately 1 week ago.  Was seen by PMD today who prescribed Keflex and told patient to come to ER if fevers persisted.  Fevers temporarily improved with Tylenol but then returned, along with flank pain, prompting patient to present to [facility] er for further evaluation.  of note, h/o recent spontaneous vaginal delivery 10/9/23; req episiotomy/small RML and c/b 3rd degree laceration req suture repair.      REVIEW OF SYSTEMS  [  ] ROS unobtainable because:    [  ] All other systems negative except as noted below    Constitutional:  [ ] fever [ ] chills  [ ] weight loss  [ ]night sweat  [ ]poor appetite/PO intake [ ]fatigue   Skin:  [ ] rash [ ] phlebitis	  Eyes: [ ] icterus [ ] pain  [ ] discharge	  ENMT: [ ] sore throat  [ ] thrush [ ] ulcers [ ] exudates [ ]anosmia  Respiratory: [ ] dyspnea [ ] hemoptysis [ ] cough [ ] sputum	  Cardiovascular:  [ ] chest pain [ ] palpitations [ ] edema	  Gastrointestinal:  [ ] nausea [ ] vomiting [ ] diarrhea [ ] constipation [ ] pain	  Genitourinary:  [ ] dysuria [ ] frequency [ ] hematuria [ ] discharge [ ] flank pain  [ ] incontinence  Musculoskeletal:  [ ] myalgias [ ] arthralgias [ ] arthritis  [ ] back pain  Neurological:  [ ] headache [ ] weakness [ ] seizures  [ ] confusion/altered mental status    prior hospital charts reviewed [V]  primary team notes reviewed [V]  other consultant notes reviewed [V]    PAST MEDICAL & SURGICAL HISTORY:  Miscarriage    Vaginal polyp    S/P dilation and curettage    SOCIAL HISTORY:  - Denied smoking/vaping/alcohol/recreational drug use    FAMILY HISTORY:    Allergies  No Known Drug Allergies  latex (Rash)    ANTIMICROBIALS:  cefTRIAXone   IVPB 1000 every 24 hours    ANTIMICROBIALS (past 90 days):  MEDICATIONS  (STANDING):    cefTRIAXone   IVPB   100 mL/Hr IV Intermittent (10-31-23 @ 21:57)    OTHER MEDS:   MEDICATIONS  (STANDING):  acetaminophen     Tablet .. 650 every 6 hours PRN  aluminum hydroxide/magnesium hydroxide/simethicone Suspension 30 every 4 hours PRN  enoxaparin Injectable 40 every 24 hours  melatonin 3 at bedtime PRN  ondansetron Injectable 4 every 8 hours PRN    VITALS:  Vital Signs Last 24 Hrs  T(F): 100.3 (11-01-23 @ 15:38), Max: 102.9 (11-01-23 @ 10:20)    Vital Signs Last 24 Hrs  HR: 99 (11-01-23 @ 13:26) (84 - 113)  BP: 101/67 (11-01-23 @ 13:26) (100/68 - 122/75)  RR: 16 (11-01-23 @ 13:26)  SpO2: 99% (11-01-23 @ 13:26) (97% - 99%)  Wt(kg): --    EXAM:  Physical Exam:  Constitutional:  well preserved, comfortable  Head/Eyes: no icterus, PERRL, EOMI  ENT:  supple; no thrush  LUNGS:  CTA  CVS:  normal S1, S2, no murmur  Abd:  soft, non-tender; non-distended  Ext:  no edema  Vascular:  IV site no erythema tenderness or discharge  MSK:  joints without swelling  Neuro: AAO X 3, non- focal    Labs:                        10.6   12.17 )-----------( 206      ( 01 Nov 2023 06:36 )             32.5     11-01    140  |  105  |  9   ----------------------------<  111<H>  3.6   |  24  |  0.70    Ca    8.5      01 Nov 2023 06:36  Phos  2.4     11-01  Mg     1.8     11-01    TPro  6.5  /  Alb  3.6  /  TBili  0.4  /  DBili  x   /  AST  13  /  ALT  12  /  AlkPhos  71  11-01    WBC Trend:  WBC Count: 12.17 (11-01-23 @ 06:36)  WBC Count: 11.50 (10-31-23 @ 22:15)    Auto Neutrophil #: 10.25 K/uL (11-01-23 @ 06:36)  Auto Neutrophil #: 9.84 K/uL (10-31-23 @ 22:15)  Auto Neutrophil #: 6.72 K/uL (10-08-23 @ 12:28)    Creatine Trend:  Creatinine: 0.70 (11-01)  Creatinine: 0.72 (10-31)    Liver Biochemical Testing Trend:  Alanine Aminotransferase (ALT/SGPT): 12 (11-01)  Alanine Aminotransferase (ALT/SGPT): 14 (10-31)  Aspartate Aminotransferase (AST/SGOT): 13 (11-01-23 @ 06:36)  Aspartate Aminotransferase (AST/SGOT): 22 (10-31-23 @ 22:15)  Bilirubin Total: 0.4 (11-01)  Bilirubin Total: 0.5 (10-31)    Trend LDH    Auto Eosinophil %: 0.2 % (11-01-23 @ 06:36)  Auto Eosinophil %: 0.1 % (10-31-23 @ 22:15)    Urinalysis Basic - ( 01 Nov 2023 06:36 )    Color: x / Appearance: x / SG: x / pH: x  Gluc: 111 mg/dL / Ketone: x  / Bili: x / Urobili: x   Blood: x / Protein: x / Nitrite: x   Leuk Esterase: x / RBC: x / WBC x   Sq Epi: x / Non Sq Epi: x / Bacteria: x    MICROBIOLOGY:    Blood Gas Venous - Lactate: 1.5 (10-31 @ 21:45)    A1C with Estimated Average Glucose Result: 5.3 % (11-01-23 @ 06:36)    RADIOLOGY:  imaging below personally reviewed    < from: US Kidney and Bladder (11.01.23 @ 01:24) >  IMPRESSION:  Normal renal ultrasound.    Low level echogenicity layering within the posterior urinary bladder,   which can be seen in setting of cystitis.    < end of copied text >         HPI:    34 yo f, recent spontaneous vaginal delivery 10/9/23, with no known pmh presented on 11/1 for fever, dysuria, left flank pain; Dysuria started approximately 1 week ago.  Was seen by PMD today who prescribed Keflex and told patient to come to ER if fevers persisted.  Fevers temporarily improved with Tylenol but then returned, along with flank pain, prompting patient to present to [facility] er for further evaluation.  of note, h/o recent spontaneous vaginal delivery 10/9/23; req episiotomy/small RML and c/b 3rd degree laceration req suture repair.    ID consulted for further recommendations.     REVIEW OF SYSTEMS  [  ] ROS unobtainable because:    [X] All other systems negative except as noted below    Constitutional:  [ ] fever [ ] chills  [ ] weight loss  [ ]night sweat  [ ]poor appetite/PO intake [ ]fatigue   Skin:  [ ] rash [ ] phlebitis	  Eyes: [ ] icterus [ ] pain  [ ] discharge	  ENMT: [ ] sore throat  [ ] thrush [ ] ulcers [ ] exudates [ ]anosmia  Respiratory: [ ] dyspnea [ ] hemoptysis [ ] cough [ ] sputum	  Cardiovascular:  [ ] chest pain [ ] palpitations [ ] edema	  Gastrointestinal:  [ ] nausea [ ] vomiting [ ] diarrhea [ ] constipation [ ] pain	  Genitourinary:  [X] dysuria [ ] frequency [ ] hematuria [ ] discharge [X] flank pain  [ ] incontinence  Musculoskeletal:  [ ] myalgias [ ] arthralgias [ ] arthritis  [ ] back pain  Neurological:  [ ] headache [ ] weakness [ ] seizures  [ ] confusion/altered mental status    prior hospital charts reviewed [V]  primary team notes reviewed [V]  other consultant notes reviewed [V]    PAST MEDICAL & SURGICAL HISTORY:  Miscarriage    Vaginal polyp    S/P dilation and curettage    SOCIAL HISTORY:  - Denied smoking/vaping/alcohol/recreational drug use    FAMILY HISTORY:    Allergies  No Known Drug Allergies  latex (Rash)    ANTIMICROBIALS:  cefTRIAXone   IVPB 1000 every 24 hours    ANTIMICROBIALS (past 90 days):  MEDICATIONS  (STANDING):    cefTRIAXone   IVPB   100 mL/Hr IV Intermittent (10-31-23 @ 21:57)    OTHER MEDS:   MEDICATIONS  (STANDING):  acetaminophen     Tablet .. 650 every 6 hours PRN  aluminum hydroxide/magnesium hydroxide/simethicone Suspension 30 every 4 hours PRN  enoxaparin Injectable 40 every 24 hours  melatonin 3 at bedtime PRN  ondansetron Injectable 4 every 8 hours PRN    VITALS:  Vital Signs Last 24 Hrs  T(F): 100.3 (11-01-23 @ 15:38), Max: 102.9 (11-01-23 @ 10:20)    Vital Signs Last 24 Hrs  HR: 99 (11-01-23 @ 13:26) (84 - 113)  BP: 101/67 (11-01-23 @ 13:26) (100/68 - 122/75)  RR: 16 (11-01-23 @ 13:26)  SpO2: 99% (11-01-23 @ 13:26) (97% - 99%)  Wt(kg): --    EXAM:  Physical Exam:  Constitutional:  well preserved, comfortable  Head/Eyes: no icterus, PERRL, EOMI  ENT:  supple; no thrush  LUNGS:  CTA  CVS:  normal S1, S2, no murmur  Abd:  soft, non-tender; non-distended, L flank pain   Ext:  no edema  Vascular:  IV site no erythema tenderness or discharge  MSK:  joints without swelling  Neuro: AAO X 3, non- focal    Labs:                        10.6   12.17 )-----------( 206      ( 01 Nov 2023 06:36 )             32.5     11-01    140  |  105  |  9   ----------------------------<  111<H>  3.6   |  24  |  0.70    Ca    8.5      01 Nov 2023 06:36  Phos  2.4     11-01  Mg     1.8     11-01    TPro  6.5  /  Alb  3.6  /  TBili  0.4  /  DBili  x   /  AST  13  /  ALT  12  /  AlkPhos  71  11-01    WBC Trend:  WBC Count: 12.17 (11-01-23 @ 06:36)  WBC Count: 11.50 (10-31-23 @ 22:15)    Auto Neutrophil #: 10.25 K/uL (11-01-23 @ 06:36)  Auto Neutrophil #: 9.84 K/uL (10-31-23 @ 22:15)  Auto Neutrophil #: 6.72 K/uL (10-08-23 @ 12:28)    Creatine Trend:  Creatinine: 0.70 (11-01)  Creatinine: 0.72 (10-31)    Liver Biochemical Testing Trend:  Alanine Aminotransferase (ALT/SGPT): 12 (11-01)  Alanine Aminotransferase (ALT/SGPT): 14 (10-31)  Aspartate Aminotransferase (AST/SGOT): 13 (11-01-23 @ 06:36)  Aspartate Aminotransferase (AST/SGOT): 22 (10-31-23 @ 22:15)  Bilirubin Total: 0.4 (11-01)  Bilirubin Total: 0.5 (10-31)    Trend LDH    Auto Eosinophil %: 0.2 % (11-01-23 @ 06:36)  Auto Eosinophil %: 0.1 % (10-31-23 @ 22:15)    Urinalysis Basic - ( 01 Nov 2023 06:36 )    Color: x / Appearance: x / SG: x / pH: x  Gluc: 111 mg/dL / Ketone: x  / Bili: x / Urobili: x   Blood: x / Protein: x / Nitrite: x   Leuk Esterase: x / RBC: x / WBC x   Sq Epi: x / Non Sq Epi: x / Bacteria: x    MICROBIOLOGY:    Blood Gas Venous - Lactate: 1.5 (10-31 @ 21:45)    A1C with Estimated Average Glucose Result: 5.3 % (11-01-23 @ 06:36)    RADIOLOGY:  imaging below personally reviewed    < from: US Kidney and Bladder (11.01.23 @ 01:24) >  IMPRESSION:  Normal renal ultrasound.    Low level echogenicity layering within the posterior urinary bladder,   which can be seen in setting of cystitis.    < end of copied text >

## 2023-11-01 NOTE — CONSULT NOTE ADULT - ASSESSMENT
A/P: 34y/o  now PPD#23 s/p  c/b RML episiotomy + 3rd degree lac and no significant PMHx, p/w fever, dysuria, left flank pain. Patient admitted with suspicion for urosepsis/pyelonephritis. Patient was febrile to 102F at 10pm yesterday. Patient denies fevers this AM, WBC 12.17 this AM. GYN consulted 2/2 patient's postpartum status, though no obstetrical complaints at this time. If concern for endometritis persists, though suspicion is unlikely given no clinical symptoms of abdominal pain, heavy lochia or uterine tenderness, would recommend switching ABx regimen to Unasyn. Suspicion is low, however.     - No acute gyn interventions warranted at this time.   -Continue to follow fever curve/WBC  -Lochia wnl at this time, RML healing as expected no signs of infection from GYN perspective.  -f/u as scheduled for PP visit in office.     to be d/w  on call for Dr. Luna Brown MD, PGY-3

## 2023-11-01 NOTE — H&P ADULT - HISTORY OF PRESENT ILLNESS
32yo 61kg f, recent spontaneous vaginal delivery 10/9/23,  w no known pmh, p/w fever, dysuria, left flank pain; Dysuria started approximately 1 week ago.  Was seen by PMD today who prescribed Keflex and told patient to come to ER if fevers persisted.  Fevers temporarily improved with Tylenol but then returned, along with flank pain, prompting patient to present to [facility] er for further evaluation.    of note, h/o recent spontaneous vaginal delivery 10/9/23; req episiotomy/small RML and c/b 3rd degree laceration req suture repair.    in er, c/f uti, r/o ?late postpartum endometritis, ?ssi; admit to medicine for further mgmt

## 2023-11-01 NOTE — PATIENT PROFILE ADULT - FALL HARM RISK - UNIVERSAL INTERVENTIONS
Bed in lowest position, wheels locked, appropriate side rails in place/Call bell, personal items and telephone in reach/Instruct patient to call for assistance before getting out of bed or chair/Non-slip footwear when patient is out of bed/[place] to call system/Physically safe environment - no spills, clutter or unnecessary equipment/Purposeful Proactive Rounding/Room/bathroom lighting operational, light cord in reach

## 2023-11-01 NOTE — H&P ADULT - PROBLEM SELECTOR PLAN 1
h/o recent spontaneous vaginal delivery 10/9/23 (req episiotomy/small RML and c/b 3rd degree laceration req suture repair)  ob documentation (delivery summary) reviewed  leukocytosis, febrile, tachycardic; meets sepsis criteria  otherwise, hds  source; based on work up thus far, suspecting uti, r/o ?late postpartum endometritis, ?ssi (over episiotomy/small RML cut vs over stitched 3rd degree laceration)  obgyn consult requested by er  s/p 1 L LR + ceftriaxone in ER  follow up blood and sputum cultures  Monitor for fever, changes in white count  broad spec empirical abx therapy as described below  ivf resusci + lytes as needed  fu obgyn consult recs

## 2023-11-01 NOTE — CONSULT NOTE ADULT - ATTENDING COMMENTS
33-year-old female with a past medical history most significant for recent spontaneous vaginal delivery on 10/9/2023 who was admitted to hospital due to left flank pain, dysuria and fever.    Patient reports developing dysuria about 1 week prior to admission.  Had presented to primary care and given cephalexin however fevers persisted prompting presentation.    Patient with recent vaginal delivery on 10/9/2023 which required episiotomy and suture repair.  Overall uneventful delivery otherwise.    In the ED, Tmax 102.9 Fahrenheit, labs show leukocytosis.  Urinalysis with evidence for pyuria, negative bacteria.  Urine and blood cultures are pending.  Imaging includes ultrasound of the kidneys showing low-level echogenicity in the posterior urinary bladder.  Patient started on ceftriaxone.    #Fever  #Pyelonephritis  #Recent vaginal delivery    Recommendations  Continue ceftriaxone for now  Follow pending urine and blood cultures  OB/GYN input  Fever source for fevers urinary rather than episiotomy site  Follow fever curve and WBC count    Matt Bates MD  Division of Infectious Diseases

## 2023-11-01 NOTE — CONSULT NOTE ADULT - SUBJECTIVE AND OBJECTIVE BOX
HPI:  32y/o  now PPD#23 s/p  c/b RML episiotomy + 3rd degree lac and no significant PMHx, p/w fever, dysuria, left flank pain. Patient states that her symptoms of dysuria started approximately 1 week ago.  Was seen by PMD today who prescribed Keflex and told patient to come to ER if fevers persisted.  Fevers temporarily improved with Tylenol but then returned, along with flank pain, prompting patient to present to [facility] er for further evaluation. Patient denies obstetrical complaints at this time including but not limited to heavy vaginal bleeding, foul smelling lochia, abdominal pain or breast tenderness. Patient is _____ breastfeeding at the moment.       PMHX; denies  PSHX; D+E for PPPROM 2019 @20w   POBHX; 10/9/2023  c/b cervical insufficiency s/p Lucas cerclage @ 12.3wga (removed at 37wga); Prior Previable PPROM in 2019 at 20wga s/p D&E  PGYNHX: denies hx of fibroids, endometriosis, ovarian cysts, STDs or abnormal pap smears.  SOCIAL: denies e/t/d use  Allergies: NKDA; latex  Meds: Keflex (10/31-)      MEDS:  acetaminophen     Tablet .. 650 milliGRAM(s) Oral every 6 hours PRN  aluminum hydroxide/magnesium hydroxide/simethicone Suspension 30 milliLiter(s) Oral every 4 hours PRN  cefTRIAXone   IVPB 1000 milliGRAM(s) IV Intermittent every 24 hours  enoxaparin Injectable 40 milliGRAM(s) SubCutaneous every 24 hours  melatonin 3 milliGRAM(s) Oral at bedtime PRN  ondansetron Injectable 4 milliGRAM(s) IV Push every 8 hours PRN  sodium chloride 0.9%. 1000 milliLiter(s) IV Continuous <Continuous>      Vital Signs Last 24 Hrs  T(C): 37.3 (2023 05:02), Max: 38.9 (31 Oct 2023 22:35)  T(F): 99.1 (2023 05:02), Max: 102 (31 Oct 2023 22:35)  HR: 97 (2023 05:02) (84 - 113)  BP: 106/73 (2023 05:02) (100/68 - 115/75)  RR: 18 (2023 05:02) (16 - 20)  SpO2: 98% (2023 05:02) (97% - 99%)    Parameters below as of 2023 05:02  Patient On (Oxygen Delivery Method): room air         PHYSICAL EXAM:  CHEST/LUNG: Clear to percussion bilaterally; No rales, rhonchi, wheezing, or rubs  HEART: Regular rate and rhythm; No murmurs, rubs, or gallops  ABDOMEN: Soft, Nontender, Nondistended; Bowel sounds present  EXTREMITIES:  2+ Peripheral Pulses, No clubbing, cyanosis, or edema  PELVIC: deferred, minimal lochia reported.    LABS:                        10.6   12.17 )-----------( 206      ( 2023 06:36 )             32.5     11    140  |  105  |  9   ----------------------------<  111<H>  3.6   |  24  |  0.70    Ca    8.5      2023 06:36  Phos  2.4     11  Mg     1.8         TPro  6.5  /  Alb  3.6  /  TBili  0.4  /  DBili  x   /  AST  13  /  ALT  12  /  AlkPhos  71  11    Urinalysis Basic - ( 2023 06:36 )    Color: x / Appearance: x / SG: x / pH: x  Gluc: 111 mg/dL / Ketone: x  / Bili: x / Urobili: x   Blood: x / Protein: x / Nitrite: x   Leuk Esterase: x / RBC: x / WBC x   Sq Epi: x / Non Sq Epi: x / Bacteria: x          RADIOLOGY STUDIES:  < from: US Kidney and Bladder (23 @ 01:24) >    ACC: [id number] EXAM:  US KIDNEYS AND BLADDER   ORDERED BY: ROSCOE HOYOS     PROCEDURE DATE:  2023          INTERPRETATION:  CLINICAL INFORMATION: UTI, rule out nephrolithiasis    COMPARISON: None available.    TECHNIQUE: Sonography of the kidneys and bladder.    FINDINGS:  Right kidney: 10.7 cm. No renal mass, hydronephrosis or calculi.    Left kidney: 10.8 cm. No renal mass, hydronephrosis or calculi.    Urinary bladder: Low-level echogenicity layering within the posterior   urinary bladder.    IMPRESSION:  Normal renal ultrasound.    Low level echogenicity layering within the posterior urinary bladder,   which can be seen in setting of cystitis.        --- End of Report ---             TIAGO AVALOS MD; Attending Radiologist  This document has been electronically signed. 2023  2:14AM    < end of copied text >

## 2023-11-01 NOTE — H&P ADULT - NSHPPHYSICALEXAM_GEN_ALL_CORE
T(C): 38.9 (10-31-23 @ 22:35), Max: 38.9 (10-31-23 @ 22:35)  HR: 94 (10-31-23 @ 22:35) (94 - 113)  BP: 114/76 (10-31-23 @ 22:35) (114/76 - 115/75)  RR: 20 (10-31-23 @ 22:35) (16 - 20)  SpO2: 97% (10-31-23 @ 22:35) (97% - 97%)  GENERAL: NAD, lying in bed    EYES: EOMI, PERRLA; conjunctiva and sclera clear  ENMT: Moist oral mucosa, no pharyngeal injection or exudate   NECK: Supple, no palpable masses; no JVD  RESPIRATORY: Normal respiratory effort; lungs are clear to auscultation bilaterally  CARDIOVASCULAR: Regular rate and rhythm, normal S1 and S2, no murmur/rub/gallop; No lower extremity edema; Peripheral pulses are 2+ bilaterally  ABDOMEN: Nontender to palpation, normoactive bowel sounds, no rebound/guarding. +cvat  MUSCULOSKELETAL:  no joint swelling or tenderness to palpation  PSYCH: A+O to person, place, and time; affect appropriate  NEUROLOGY: CN 2-12 are intact and symmetric; no gross motor or sensory deficits   SKIN: No rashes; no palpable lesions

## 2023-11-01 NOTE — H&P ADULT - NSHPREVIEWOFSYSTEMS_GEN_ALL_CORE
CONSTITUTIONAL: + fever. +weakness  ENMT:  No sinus or throat pain  RESPIRATORY: No cough, wheezing, chills or hemoptysis; No shortness of breath  CARDIOVASCULAR: No chest pain, palpitations, dizziness, or leg swelling  GASTROINTESTINAL: No abdominal or epigastric pain. No nausea, vomiting, or hematemesis; No diarrhea or constipation. No melena or hematochezia.  GENITOURINARY: + dysuria, + flank pain  NEUROLOGICAL: No headaches, memory loss, loss of strength, numbness, or tremors  SKIN: No rashes,  No hives or eczema  ENDOCRINE: No heat or cold intolerance; No hair loss  MUSCULOSKELETAL: No joint pain or swelling; No muscle, back, or extremity pain  PSYCHIATRIC: No depression, anxiety, mood swings, or difficulty sleeping  HEME/LYMPH: No easy bruising, or bleeding gums; no enlarged LN

## 2023-11-01 NOTE — CONSULT NOTE ADULT - ASSESSMENT
34 yo f, recent spontaneous vaginal delivery 10/9/23, with no known pmh presented on 11/1 for fever, dysuria, left flank pain.    febrile Tmax 102.9F  Tachycardic   Leukocytosis 11-->12 with neutrophilic shift     Workup:   UA (10/31): WBC 33, neg bacteria, large LE, neg Nitrite   Urine Cx pending   Blood Cx pending   US kidneys: Low level echogenicity layering within the posterior urinary bladder, which can be seen in setting of cystitis.    Antimicrobials:   Ceftriaxone     #Fever, tachycardia, leukocytosis  #Flank pain, pyuria  #S/P Vaginal delivery on 10/9      Recommendations:            PT TO BE SEEN. PRELIM NOTE  PENDING RECS. PLEASE WAIT FOR FINAL RECS AFTER DISCUSSION WITH ATTENDING#         34 yo f, recent spontaneous vaginal delivery 10/9/23, with no known pmh presented on 11/1 for fever, dysuria, left flank pain.    febrile Tmax 102.9F  Tachycardic   Leukocytosis 11-->12 with neutrophilic shift     Workup:   UA (10/31): WBC 33, neg bacteria, large LE, neg Nitrite   Urine Cx pending   Blood Cx pending   US kidneys: Low level echogenicity layering within the posterior urinary bladder, which can be seen in setting of cystitis.    Antimicrobials:   Ceftriaxone     #Fever, tachycardia, leukocytosis  #L Flank pain, pyuria, neg for bacteria (however patient received Keflex as outpatient), concern for pyelonephritis   #S/P Vaginal delivery on 10/9 with episiotomy, no vaginal discharge, no pain or drainage from episiotomy site, low concern clinically for infected surgical site, low concern for mastitis, low concern for endometritis given absence of abdominal tenderness, vaginal discharge..    Recommendations:  -Continue Ceftriaxone 1 g IV daily   -F/up urine culture   -F/up blood Cx  -OBGYN following   -Monitor for any more diarrheal episodes   -Monitor T curve and WBC trend       Discussed with Dr Jana Cordoba MD, PGY5  ID fellow  [employer] Teams Preferred  After 5pm/weekends call [phone]

## 2023-11-01 NOTE — H&P ADULT - ASSESSMENT
Febrile, tachycardic, leukocytosis, lactic acidosis (resolved); meets severe sepsis criteria  Ua with nitrites but no bacteria + pyuria with leukocyte esterase;   ct showing b/l pyelonephritis, possible small renal abscess  s/p 1 L lr bolus + vanc, zosyn  follow up blood and urine cultures; renal/bladder us; freq bladder scan  monitor for fever, changes in white count  start empirical ceftriaxone; adjust according to final c+s  antipyretics, antiemetics, analgesics as needed  encourage po intake; judicious ivf + lytes as needed in the mean time     leukocytosis w left shift (however, patient was just recently started on course of prednisone for empirical mgmt of sob by outpatient provider; may be contributing leukocytosis), tachypneic, lactic acidosis; meets severe sirs/sepsis criteria  otherwise, afebrile and hds  unclear source; based on work up thus far, suspecting xx, r/o infectious etiology  s/p 2 L LR + ceftriaxone and azithromycin in ER  repeat lactate now; trend lactate q4-6h until wnl  obtain ua, procal, ct chest, blood and sputum cultures, atypical pna urine ag, mrsa screen  Monitor for fever, changes in white count  broad spec empirical abx therapy as described below  ivf resusci + lytes as needed.   32yo 61kg f, recent spontaneous vaginal delivery 10/9/23,  w no known pmh, p/w fever, dysuria, left flank pain; in er, c/f uti, r/o ?late postpartum endometritis, ?ssi; admit to medicine for further mgmt

## 2023-11-01 NOTE — H&P ADULT - PROBLEM SELECTOR PLAN 2
fever iso dysuria + left flank pain; +cvat  failed outpatient treatment w course of keflex  Ua with no nitrites, no bacteria , but pyuria with leukocyte esterase; likely confounded by recent abx use  s/p 1 L lr bolus + rocephin  follow up blood and urine cultures; renal/bladder us   cont empirical rocephin; adjust according to final c+s  antipyretics, antiemetics, analgesics as needed  encourage po intake; judicious ivf + lytes as needed in the mean time

## 2023-11-02 LAB
ANION GAP SERPL CALC-SCNC: 15 MMOL/L — SIGNIFICANT CHANGE UP (ref 5–17)
ANION GAP SERPL CALC-SCNC: 15 MMOL/L — SIGNIFICANT CHANGE UP (ref 5–17)
BUN SERPL-MCNC: 9 MG/DL — SIGNIFICANT CHANGE UP (ref 7–23)
BUN SERPL-MCNC: 9 MG/DL — SIGNIFICANT CHANGE UP (ref 7–23)
CALCIUM SERPL-MCNC: 8.6 MG/DL — SIGNIFICANT CHANGE UP (ref 8.4–10.5)
CALCIUM SERPL-MCNC: 8.6 MG/DL — SIGNIFICANT CHANGE UP (ref 8.4–10.5)
CHLORIDE SERPL-SCNC: 106 MMOL/L — SIGNIFICANT CHANGE UP (ref 96–108)
CHLORIDE SERPL-SCNC: 106 MMOL/L — SIGNIFICANT CHANGE UP (ref 96–108)
CO2 SERPL-SCNC: 18 MMOL/L — LOW (ref 22–31)
CO2 SERPL-SCNC: 18 MMOL/L — LOW (ref 22–31)
CREAT SERPL-MCNC: 0.72 MG/DL — SIGNIFICANT CHANGE UP (ref 0.5–1.3)
CREAT SERPL-MCNC: 0.72 MG/DL — SIGNIFICANT CHANGE UP (ref 0.5–1.3)
CULTURE RESULTS: SIGNIFICANT CHANGE UP
CULTURE RESULTS: SIGNIFICANT CHANGE UP
EGFR: 112 ML/MIN/1.73M2 — SIGNIFICANT CHANGE UP
EGFR: 112 ML/MIN/1.73M2 — SIGNIFICANT CHANGE UP
GLUCOSE SERPL-MCNC: 104 MG/DL — HIGH (ref 70–99)
GLUCOSE SERPL-MCNC: 104 MG/DL — HIGH (ref 70–99)
HCT VFR BLD CALC: 32.1 % — LOW (ref 34.5–45)
HCT VFR BLD CALC: 32.1 % — LOW (ref 34.5–45)
HGB BLD-MCNC: 10.6 G/DL — LOW (ref 11.5–15.5)
HGB BLD-MCNC: 10.6 G/DL — LOW (ref 11.5–15.5)
MCHC RBC-ENTMCNC: 30.8 PG — SIGNIFICANT CHANGE UP (ref 27–34)
MCHC RBC-ENTMCNC: 30.8 PG — SIGNIFICANT CHANGE UP (ref 27–34)
MCHC RBC-ENTMCNC: 33 GM/DL — SIGNIFICANT CHANGE UP (ref 32–36)
MCHC RBC-ENTMCNC: 33 GM/DL — SIGNIFICANT CHANGE UP (ref 32–36)
MCV RBC AUTO: 93.3 FL — SIGNIFICANT CHANGE UP (ref 80–100)
MCV RBC AUTO: 93.3 FL — SIGNIFICANT CHANGE UP (ref 80–100)
NRBC # BLD: 0 /100 WBCS — SIGNIFICANT CHANGE UP (ref 0–0)
NRBC # BLD: 0 /100 WBCS — SIGNIFICANT CHANGE UP (ref 0–0)
PHOSPHATE SERPL-MCNC: 2.6 MG/DL — SIGNIFICANT CHANGE UP (ref 2.5–4.5)
PHOSPHATE SERPL-MCNC: 2.6 MG/DL — SIGNIFICANT CHANGE UP (ref 2.5–4.5)
PLATELET # BLD AUTO: 220 K/UL — SIGNIFICANT CHANGE UP (ref 150–400)
PLATELET # BLD AUTO: 220 K/UL — SIGNIFICANT CHANGE UP (ref 150–400)
POTASSIUM SERPL-MCNC: 3.2 MMOL/L — LOW (ref 3.5–5.3)
POTASSIUM SERPL-MCNC: 3.2 MMOL/L — LOW (ref 3.5–5.3)
POTASSIUM SERPL-SCNC: 3.2 MMOL/L — LOW (ref 3.5–5.3)
POTASSIUM SERPL-SCNC: 3.2 MMOL/L — LOW (ref 3.5–5.3)
RBC # BLD: 3.44 M/UL — LOW (ref 3.8–5.2)
RBC # BLD: 3.44 M/UL — LOW (ref 3.8–5.2)
RBC # FLD: 13.4 % — SIGNIFICANT CHANGE UP (ref 10.3–14.5)
RBC # FLD: 13.4 % — SIGNIFICANT CHANGE UP (ref 10.3–14.5)
SODIUM SERPL-SCNC: 139 MMOL/L — SIGNIFICANT CHANGE UP (ref 135–145)
SODIUM SERPL-SCNC: 139 MMOL/L — SIGNIFICANT CHANGE UP (ref 135–145)
SPECIMEN SOURCE: SIGNIFICANT CHANGE UP
SPECIMEN SOURCE: SIGNIFICANT CHANGE UP
WBC # BLD: 9.02 K/UL — SIGNIFICANT CHANGE UP (ref 3.8–10.5)
WBC # BLD: 9.02 K/UL — SIGNIFICANT CHANGE UP (ref 3.8–10.5)
WBC # FLD AUTO: 9.02 K/UL — SIGNIFICANT CHANGE UP (ref 3.8–10.5)
WBC # FLD AUTO: 9.02 K/UL — SIGNIFICANT CHANGE UP (ref 3.8–10.5)

## 2023-11-02 PROCEDURE — 99232 SBSQ HOSP IP/OBS MODERATE 35: CPT

## 2023-11-02 RX ORDER — POTASSIUM CHLORIDE 20 MEQ
40 PACKET (EA) ORAL EVERY 4 HOURS
Refills: 0 | Status: COMPLETED | OUTPATIENT
Start: 2023-11-02 | End: 2023-11-03

## 2023-11-02 RX ORDER — ACETAMINOPHEN 500 MG
1000 TABLET ORAL ONCE
Refills: 0 | Status: COMPLETED | OUTPATIENT
Start: 2023-11-02 | End: 2023-11-02

## 2023-11-02 RX ADMIN — Medication 1000 MILLIGRAM(S): at 02:39

## 2023-11-02 RX ADMIN — Medication 650 MILLIGRAM(S): at 16:34

## 2023-11-02 RX ADMIN — Medication 40 MILLIEQUIVALENT(S): at 16:05

## 2023-11-02 RX ADMIN — Medication 650 MILLIGRAM(S): at 16:04

## 2023-11-02 RX ADMIN — Medication 40 MILLIEQUIVALENT(S): at 23:18

## 2023-11-02 RX ADMIN — Medication 400 MILLIGRAM(S): at 02:09

## 2023-11-02 RX ADMIN — CEFTRIAXONE 100 MILLIGRAM(S): 500 INJECTION, POWDER, FOR SOLUTION INTRAMUSCULAR; INTRAVENOUS at 21:33

## 2023-11-03 LAB
ANION GAP SERPL CALC-SCNC: 12 MMOL/L — SIGNIFICANT CHANGE UP (ref 5–17)
ANION GAP SERPL CALC-SCNC: 12 MMOL/L — SIGNIFICANT CHANGE UP (ref 5–17)
BUN SERPL-MCNC: 10 MG/DL — SIGNIFICANT CHANGE UP (ref 7–23)
BUN SERPL-MCNC: 10 MG/DL — SIGNIFICANT CHANGE UP (ref 7–23)
CALCIUM SERPL-MCNC: 9.5 MG/DL — SIGNIFICANT CHANGE UP (ref 8.4–10.5)
CALCIUM SERPL-MCNC: 9.5 MG/DL — SIGNIFICANT CHANGE UP (ref 8.4–10.5)
CHLORIDE SERPL-SCNC: 108 MMOL/L — SIGNIFICANT CHANGE UP (ref 96–108)
CHLORIDE SERPL-SCNC: 108 MMOL/L — SIGNIFICANT CHANGE UP (ref 96–108)
CO2 SERPL-SCNC: 20 MMOL/L — LOW (ref 22–31)
CO2 SERPL-SCNC: 20 MMOL/L — LOW (ref 22–31)
CREAT SERPL-MCNC: 0.7 MG/DL — SIGNIFICANT CHANGE UP (ref 0.5–1.3)
CREAT SERPL-MCNC: 0.7 MG/DL — SIGNIFICANT CHANGE UP (ref 0.5–1.3)
EGFR: 116 ML/MIN/1.73M2 — SIGNIFICANT CHANGE UP
EGFR: 116 ML/MIN/1.73M2 — SIGNIFICANT CHANGE UP
GLUCOSE SERPL-MCNC: 98 MG/DL — SIGNIFICANT CHANGE UP (ref 70–99)
GLUCOSE SERPL-MCNC: 98 MG/DL — SIGNIFICANT CHANGE UP (ref 70–99)
HCT VFR BLD CALC: 32.8 % — LOW (ref 34.5–45)
HCT VFR BLD CALC: 32.8 % — LOW (ref 34.5–45)
HGB BLD-MCNC: 10.7 G/DL — LOW (ref 11.5–15.5)
HGB BLD-MCNC: 10.7 G/DL — LOW (ref 11.5–15.5)
MCHC RBC-ENTMCNC: 30.7 PG — SIGNIFICANT CHANGE UP (ref 27–34)
MCHC RBC-ENTMCNC: 30.7 PG — SIGNIFICANT CHANGE UP (ref 27–34)
MCHC RBC-ENTMCNC: 32.6 GM/DL — SIGNIFICANT CHANGE UP (ref 32–36)
MCHC RBC-ENTMCNC: 32.6 GM/DL — SIGNIFICANT CHANGE UP (ref 32–36)
MCV RBC AUTO: 94.3 FL — SIGNIFICANT CHANGE UP (ref 80–100)
MCV RBC AUTO: 94.3 FL — SIGNIFICANT CHANGE UP (ref 80–100)
NRBC # BLD: 0 /100 WBCS — SIGNIFICANT CHANGE UP (ref 0–0)
NRBC # BLD: 0 /100 WBCS — SIGNIFICANT CHANGE UP (ref 0–0)
PLATELET # BLD AUTO: 244 K/UL — SIGNIFICANT CHANGE UP (ref 150–400)
PLATELET # BLD AUTO: 244 K/UL — SIGNIFICANT CHANGE UP (ref 150–400)
POTASSIUM SERPL-MCNC: 4.6 MMOL/L — SIGNIFICANT CHANGE UP (ref 3.5–5.3)
POTASSIUM SERPL-MCNC: 4.6 MMOL/L — SIGNIFICANT CHANGE UP (ref 3.5–5.3)
POTASSIUM SERPL-SCNC: 4.6 MMOL/L — SIGNIFICANT CHANGE UP (ref 3.5–5.3)
POTASSIUM SERPL-SCNC: 4.6 MMOL/L — SIGNIFICANT CHANGE UP (ref 3.5–5.3)
RBC # BLD: 3.48 M/UL — LOW (ref 3.8–5.2)
RBC # BLD: 3.48 M/UL — LOW (ref 3.8–5.2)
RBC # FLD: 13.5 % — SIGNIFICANT CHANGE UP (ref 10.3–14.5)
RBC # FLD: 13.5 % — SIGNIFICANT CHANGE UP (ref 10.3–14.5)
SODIUM SERPL-SCNC: 140 MMOL/L — SIGNIFICANT CHANGE UP (ref 135–145)
SODIUM SERPL-SCNC: 140 MMOL/L — SIGNIFICANT CHANGE UP (ref 135–145)
WBC # BLD: 6.15 K/UL — SIGNIFICANT CHANGE UP (ref 3.8–10.5)
WBC # BLD: 6.15 K/UL — SIGNIFICANT CHANGE UP (ref 3.8–10.5)
WBC # FLD AUTO: 6.15 K/UL — SIGNIFICANT CHANGE UP (ref 3.8–10.5)
WBC # FLD AUTO: 6.15 K/UL — SIGNIFICANT CHANGE UP (ref 3.8–10.5)

## 2023-11-03 PROCEDURE — 99232 SBSQ HOSP IP/OBS MODERATE 35: CPT

## 2023-11-03 PROCEDURE — 74177 CT ABD & PELVIS W/CONTRAST: CPT | Mod: 26

## 2023-11-03 RX ADMIN — CEFTRIAXONE 100 MILLIGRAM(S): 500 INJECTION, POWDER, FOR SOLUTION INTRAMUSCULAR; INTRAVENOUS at 21:57

## 2023-11-03 RX ADMIN — Medication 40 MILLIEQUIVALENT(S): at 03:10

## 2023-11-03 NOTE — PROGRESS NOTE ADULT - SUBJECTIVE AND OBJECTIVE BOX
Follow Up:  pyelo    Interval History/ROS:  Overnight: No acute events.  Patient afebrile more than 24 hours at this point.  Otherwise hemodynamically stable on room air.  Latest labs show no leukocytosis, anemia 10.7/32.8, BMP with renal function within normal limits.  Blood cultures from 10/31/2023 remain negative to date.    Patient seen examined at bedside.  Reports improved but persistent pain in the left lower quadrant and left flank.Allergies  No Known Drug Allergies  latex (Rash)        ANTIMICROBIALS:  cefTRIAXone   IVPB 1000 every 24 hours      OTHER MEDS:  MEDICATIONS  (STANDING):  acetaminophen     Tablet .. 650 every 6 hours PRN  aluminum hydroxide/magnesium hydroxide/simethicone Suspension 30 every 4 hours PRN  enoxaparin Injectable 40 every 24 hours  melatonin 3 at bedtime PRN  ondansetron Injectable 4 every 8 hours PRN      Vital Signs Last 24 Hrs  T(C): 36.9 (03 Nov 2023 12:20), Max: 37.2 (02 Nov 2023 20:19)  T(F): 98.5 (03 Nov 2023 12:20), Max: 99 (02 Nov 2023 20:19)  HR: 95 (03 Nov 2023 12:20) (80 - 95)  BP: 102/70 (03 Nov 2023 12:20) (102/63 - 114/76)  BP(mean): --  RR: 18 (03 Nov 2023 12:20) (18 - 18)  SpO2: 96% (03 Nov 2023 12:20) (96% - 97%)    Parameters below as of 03 Nov 2023 12:20  Patient On (Oxygen Delivery Method): room air        PHYSICAL EXAM:  Constitutional:  well preserved, comfortable  Head/Eyes: no icterus, PERRL, EOMI  ENT:  supple; no thrush  LUNGS:  CTA  CVS:  normal S1, S2, no murmur  Abd:  soft, non-tender; non-distended, L flank pain - improved  Ext:  no edema  Vascular:  IV site no erythema tenderness or discharge  MSK:  joints without swelling  Neuro: AAO X 3, non- focal                                10.7   6.15  )-----------( 244      ( 03 Nov 2023 07:15 )             32.8       11-03    140  |  108  |  10  ----------------------------<  98  4.6   |  20<L>  |  0.70    Ca    9.5      03 Nov 2023 07:15  Phos  2.6     11-02        Urinalysis Basic - ( 03 Nov 2023 07:15 )    Color: x / Appearance: x / SG: x / pH: x  Gluc: 98 mg/dL / Ketone: x  / Bili: x / Urobili: x   Blood: x / Protein: x / Nitrite: x   Leuk Esterase: x / RBC: x / WBC x   Sq Epi: x / Non Sq Epi: x / Bacteria: x        MICROBIOLOGY:  v    Culture - Blood (collected 31 Oct 2023 22:18)  Source: .Blood Blood  Preliminary Report (03 Nov 2023 01:02):    No growth at 48 Hours    Culture - Blood (collected 31 Oct 2023 22:18)  Source: .Blood Blood  Preliminary Report (03 Nov 2023 01:02):    No growth at 48 Hours    Culture - Urine (collected 31 Oct 2023 22:15)  Source: Clean Catch Clean Catch (Midstream)  Final Report (02 Nov 2023 12:06):    <10,000 CFU/mL Normal Urogenital Janeth                    RADIOLOGY:  Imaging reviewed
  Follow Up:  pyelo    Interval History/ROS:  Overnight: No acute events.  Patient with 102.8 Fahrenheit Tmax past 24 hours.  Otherwise hemodynamically stable on room air.  Latest labs show no leukocytosis, anemia 10.6/32.1, renal function within normal limits.  Hepatic function within normal limits.  Urine culture from 10/31/2023 without growth.  Blood cultures from 10/31/2023 without growth.    Patient seen examined at bedside.  Reports overall feeling better, reports improvement to flank pain.    Allergies  No Known Drug Allergies  latex (Rash)        ANTIMICROBIALS:  cefTRIAXone   IVPB 1000 every 24 hours      OTHER MEDS:  MEDICATIONS  (STANDING):  acetaminophen     Tablet .. 650 every 6 hours PRN  aluminum hydroxide/magnesium hydroxide/simethicone Suspension 30 every 4 hours PRN  enoxaparin Injectable 40 every 24 hours  melatonin 3 at bedtime PRN  ondansetron Injectable 4 every 8 hours PRN      Vital Signs Last 24 Hrs  T(C): 37.4 (02 Nov 2023 16:15), Max: 39.3 (02 Nov 2023 01:55)  T(F): 99.4 (02 Nov 2023 16:15), Max: 102.8 (02 Nov 2023 01:55)  HR: 98 (02 Nov 2023 16:15) (88 - 101)  BP: 120/80 (02 Nov 2023 16:15) (112/67 - 122/68)  BP(mean): --  RR: 18 (02 Nov 2023 16:15) (18 - 18)  SpO2: 98% (02 Nov 2023 16:15) (97% - 98%)    Parameters below as of 02 Nov 2023 16:15  Patient On (Oxygen Delivery Method): room air        PHYSICAL EXAM:  Constitutional:  well preserved, comfortable  Head/Eyes: no icterus, PERRL, EOMI  ENT:  supple; no thrush  LUNGS:  CTA  CVS:  normal S1, S2, no murmur  Abd:  soft, non-tender; non-distended, L flank pain - improved  Ext:  no edema  Vascular:  IV site no erythema tenderness or discharge  MSK:  joints without swelling  Neuro: AAO X 3, non- focal                                10.6   9.02  )-----------( 220      ( 02 Nov 2023 07:19 )             32.1       11-02    139  |  106  |  9   ----------------------------<  104<H>  3.2<L>   |  18<L>  |  0.72    Ca    8.6      02 Nov 2023 07:19  Phos  2.6     11-02  Mg     1.8     11-01    TPro  6.5  /  Alb  3.6  /  TBili  0.4  /  DBili  x   /  AST  13  /  ALT  12  /  AlkPhos  71  11-01      Urinalysis Basic - ( 02 Nov 2023 07:19 )    Color: x / Appearance: x / SG: x / pH: x  Gluc: 104 mg/dL / Ketone: x  / Bili: x / Urobili: x   Blood: x / Protein: x / Nitrite: x   Leuk Esterase: x / RBC: x / WBC x   Sq Epi: x / Non Sq Epi: x / Bacteria: x        MICROBIOLOGY:  v    Culture - Blood (collected 31 Oct 2023 22:18)  Source: .Blood Blood  Preliminary Report (02 Nov 2023 01:03):    No growth at 24 hours    Culture - Blood (collected 31 Oct 2023 22:18)  Source: .Blood Blood  Preliminary Report (02 Nov 2023 01:03):    No growth at 24 hours    Culture - Urine (collected 31 Oct 2023 22:15)  Source: Clean Catch Clean Catch (Midstream)  Final Report (02 Nov 2023 12:06):    <10,000 CFU/mL Normal Urogenital Janeth                    RADIOLOGY:  Imaging reviewed
Patient is a 33y old  Female who presents with a chief complaint of fever, dysuria, flank pain (01 Nov 2023 16:12)      SUBJECTIVE / OVERNIGHT EVENTS:  Pt seen and examined w/ at bedside. No acute events overnight. Reports subjective fever , c/o mild suprapubic pain. Denies dysuria, n/v.    MEDICATIONS  (STANDING):  cefTRIAXone   IVPB 1000 milliGRAM(s) IV Intermittent every 24 hours  enoxaparin Injectable 40 milliGRAM(s) SubCutaneous every 24 hours  sodium chloride 0.9%. 1000 milliLiter(s) (85 mL/Hr) IV Continuous <Continuous>    MEDICATIONS  (PRN):  acetaminophen     Tablet .. 650 milliGRAM(s) Oral every 6 hours PRN Temp greater or equal to 38C (100.4F), Mild Pain (1 - 3)  aluminum hydroxide/magnesium hydroxide/simethicone Suspension 30 milliLiter(s) Oral every 4 hours PRN Dyspepsia  melatonin 3 milliGRAM(s) Oral at bedtime PRN Insomnia  ondansetron Injectable 4 milliGRAM(s) IV Push every 8 hours PRN Nausea and/or Vomiting      Vital Signs Last 24 Hrs  T(C): 37.3 (02 Nov 2023 13:04), Max: 39.4 (01 Nov 2023 17:07)  T(F): 99.1 (02 Nov 2023 13:04), Max: 102.9 (01 Nov 2023 17:07)  HR: 101 (02 Nov 2023 13:04) (88 - 107)  BP: 114/77 (02 Nov 2023 13:04) (101/69 - 122/68)  BP(mean): --  RR: 18 (02 Nov 2023 13:04) (18 - 18)  SpO2: 97% (02 Nov 2023 13:04) (97% - 100%)    Parameters below as of 02 Nov 2023 13:04  Patient On (Oxygen Delivery Method): room air      CAPILLARY BLOOD GLUCOSE        I&O's Summary    02 Nov 2023 07:01  -  02 Nov 2023 13:53  --------------------------------------------------------  IN: 480 mL / OUT: 0 mL / NET: 480 mL        PHYSICAL EXAM:  GENERAL: NAD, well- groomed  HEAD:  Atraumatic, Normocephalic  EYES: , conjunctiva and sclera clear  NECK: Supple, No JVD  CHEST/LUNG: Clear to auscultation bilaterally; No wheeze  HEART: Regular rate and rhythm; No murmurs, rubs, or gallops  ABDOMEN: Soft, mild suprapubic TTP, Nondistended; Bowel sounds present; b/l flank TTP  EXTREMITIES:  2+ Peripheral Pulses, No clubbing, cyanosis, or edema  PSYCH: AAOx3  NEUROLOGY: non-focal  SKIN: No rashes or lesions    LABS:                        10.6   9.02  )-----------( 220      ( 02 Nov 2023 07:19 )             32.1     11-02    139  |  106  |  9   ----------------------------<  104<H>  3.2<L>   |  18<L>  |  0.72    Ca    8.6      02 Nov 2023 07:19  Phos  2.6     11-02  Mg     1.8     11-01    TPro  6.5  /  Alb  3.6  /  TBili  0.4  /  DBili  x   /  AST  13  /  ALT  12  /  AlkPhos  71  11-01    PT/INR - ( 01 Nov 2023 06:36 )   PT: 11.7 sec;   INR: 1.12 ratio         PTT - ( 01 Nov 2023 06:36 )  PTT:29.3 sec      Urinalysis Basic - ( 02 Nov 2023 07:19 )    Color: x / Appearance: x / SG: x / pH: x  Gluc: 104 mg/dL / Ketone: x  / Bili: x / Urobili: x   Blood: x / Protein: x / Nitrite: x   Leuk Esterase: x / RBC: x / WBC x   Sq Epi: x / Non Sq Epi: x / Bacteria: x        
TAVR ORIF ankle
Patient is a 33y old  Female who presents with a chief complaint of fever, dysuria, flank pain (03 Nov 2023 13:32)      SUBJECTIVE / OVERNIGHT EVENTS:  Pt seen and examined with  at bedside. No acute events overnight. She denies fever/chills, n/v, suprapubic tenderness, dysuria. She does c/o worsening left flank pain.    MEDICATIONS  (STANDING):  cefTRIAXone   IVPB 1000 milliGRAM(s) IV Intermittent every 24 hours  enoxaparin Injectable 40 milliGRAM(s) SubCutaneous every 24 hours  sodium chloride 0.9%. 1000 milliLiter(s) (85 mL/Hr) IV Continuous <Continuous>    MEDICATIONS  (PRN):  acetaminophen     Tablet .. 650 milliGRAM(s) Oral every 6 hours PRN Temp greater or equal to 38C (100.4F), Mild Pain (1 - 3)  aluminum hydroxide/magnesium hydroxide/simethicone Suspension 30 milliLiter(s) Oral every 4 hours PRN Dyspepsia  melatonin 3 milliGRAM(s) Oral at bedtime PRN Insomnia  ondansetron Injectable 4 milliGRAM(s) IV Push every 8 hours PRN Nausea and/or Vomiting      Vital Signs Last 24 Hrs  T(C): 36.9 (03 Nov 2023 12:20), Max: 37.4 (02 Nov 2023 16:15)  T(F): 98.5 (03 Nov 2023 12:20), Max: 99.4 (02 Nov 2023 16:15)  HR: 95 (03 Nov 2023 12:20) (80 - 98)  BP: 102/70 (03 Nov 2023 12:20) (102/63 - 120/80)  BP(mean): --  RR: 18 (03 Nov 2023 12:20) (18 - 18)  SpO2: 96% (03 Nov 2023 12:20) (96% - 98%)    Parameters below as of 03 Nov 2023 12:20  Patient On (Oxygen Delivery Method): room air      CAPILLARY BLOOD GLUCOSE        I&O's Summary    02 Nov 2023 07:01  -  03 Nov 2023 07:00  --------------------------------------------------------  IN: 780 mL / OUT: 0 mL / NET: 780 mL        PHYSICAL EXAM:  GENERAL: NAD, well-groomed  HEAD:  Atraumatic, Normocephalic  EYES: conjunctiva and sclera clear  NECK: Supple, No JVD  CHEST/LUNG: Clear to auscultation bilaterally; No wheeze  HEART: Regular rate and rhythm; No murmurs, rubs, or gallops  ABDOMEN: ++CVA tenderness,  Soft, Nontender, Nondistended; Bowel sounds present  EXTREMITIES:  2+ Peripheral Pulses, No clubbing, cyanosis, or edema  PSYCH: AAOx3  NEUROLOGY: non-focal  SKIN: No rashes or lesions    LABS:                        10.7   6.15  )-----------( 244      ( 03 Nov 2023 07:15 )             32.8     11-03    140  |  108  |  10  ----------------------------<  98  4.6   |  20<L>  |  0.70    Ca    9.5      03 Nov 2023 07:15  Phos  2.6     11-02            Urinalysis Basic - ( 03 Nov 2023 07:15 )    Color: x / Appearance: x / SG: x / pH: x  Gluc: 98 mg/dL / Ketone: x  / Bili: x / Urobili: x   Blood: x / Protein: x / Nitrite: x   Leuk Esterase: x / RBC: x / WBC x   Sq Epi: x / Non Sq Epi: x / Bacteria: x

## 2023-11-03 NOTE — PROGRESS NOTE ADULT - NSPROGADDITIONALINFOA_GEN_ALL_CORE
time spent reviewing prior charts, meds, discussing plan with patient= 45 minutes    d/w ACP Myles
time spent reviewing prior charts, meds, discussing plan with patient= 45 minutes    d/w ACP Silver

## 2023-11-03 NOTE — PROGRESS NOTE ADULT - PROBLEM SELECTOR PLAN 1
h/o recent spontaneous vaginal delivery 10/9/23 (req episiotomy/small RML and c/b 3rd degree laceration req suture repair)  ob documentation (delivery summary) reviewed  leukocytosis, febrile, tachycardic; meets sepsis criteria  otherwise, hds  source; based on work up thus far, suspecting uti, r/o ?late postpartum endometritis, ?ssi (over episiotomy/small RML cut vs over stitched 3rd degree laceration)  Seen by OBGYN in ED ; No acute gyn interventions warranted at this time.   s/p 1 L LR + ceftriaxone in ED  Bld cx NGTD x 48h (prelim)  Urine cx NGTD  pt however c/o worsening left flank pain with ++L CVA tenderness on exam  c/w CTX for now  check CTAP w/IV contrast to r/o abscess
h/o recent spontaneous vaginal delivery 10/9/23 (req episiotomy/small RML and c/b 3rd degree laceration req suture repair)  ob documentation (delivery summary) reviewed  leukocytosis, febrile, tachycardic; meets sepsis criteria  otherwise, hds  source; based on work up thus far, suspecting uti, r/o ?late postpartum endometritis, ?ssi (over episiotomy/small RML cut vs over stitched 3rd degree laceration)  Seen by OBGYN in ED ; No acute gyn interventions warranted at this time.   s/p 1 L LR + ceftriaxone in ED  Bld cx NGTD (prelim)  c/w CTX  f/up urine cx  Monitor for fever, changes in white count

## 2023-11-03 NOTE — PROGRESS NOTE ADULT - REASON FOR ADMISSION
fever, dysuria, flank pain

## 2023-11-03 NOTE — PROGRESS NOTE ADULT - ASSESSMENT
33-year-old female with a past medical history most significant for recent spontaneous vaginal delivery on 10/9/2023 who was admitted to hospital due to left flank pain, dysuria and fever.    Patient reports developing dysuria about 1 week prior to admission.  Had presented to primary care and given cephalexin however fevers persisted prompting presentation.    Patient with recent vaginal delivery on 10/9/2023 which required episiotomy and suture repair.  Overall uneventful delivery otherwise.    In the ED, Tmax 102.9 Fahrenheit, labs show leukocytosis.  Urinalysis with evidence for pyuria, negative bacteria.  Urine and blood cultures are pending.  Imaging includes ultrasound of the kidneys showing low-level echogenicity in the posterior urinary bladder.  Patient started on ceftriaxone.    #Fever  #Pyelonephritis  #Recent vaginal delivery  Ucx 10/31 without growth  Improving flank tenderness  leukocytosis resolved  Patient is breastfeeding    Recommendations  Continue ceftriaxone for now  Plan for 10 day total therapy, would  transition to PO amox/clav if otherwise ready for discharge, end date: 11/11/23  Amox/clav safe with breastfeeding, advised to monitor infant stool output while on medication  Follow pending blood cultures  OB/GYN input  Favor source for fevers urinary rather than episiotomy site  Follow CT scan  Follow fever curve and WBC count    ID to sign off. Please contact as issues arise.    Matt Bates MD  Division of Infectious Diseases
33-year-old female with a past medical history most significant for recent spontaneous vaginal delivery on 10/9/2023 who was admitted to hospital due to left flank pain, dysuria and fever.    Patient reports developing dysuria about 1 week prior to admission.  Had presented to primary care and given cephalexin however fevers persisted prompting presentation.    Patient with recent vaginal delivery on 10/9/2023 which required episiotomy and suture repair.  Overall uneventful delivery otherwise.    In the ED, Tmax 102.9 Fahrenheit, labs show leukocytosis.  Urinalysis with evidence for pyuria, negative bacteria.  Urine and blood cultures are pending.  Imaging includes ultrasound of the kidneys showing low-level echogenicity in the posterior urinary bladder.  Patient started on ceftriaxone.    #Fever  #Pyelonephritis  #Recent vaginal delivery  Ucx 10/31 without growth  Improving flank tenderness  leukocytosis resolved      Recommendations  Continue ceftriaxone for now  Follow pending blood cultures  OB/GYN input  Favor source for fevers urinary rather than episiotomy site  If fevers persist, leukocytosis worsens then obtain CT A/P  Follow fever curve and WBC count    Matt Bates MD  Division of Infectious Diseases
32yo 61kg f, recent spontaneous vaginal delivery 10/9/23,  w no known pmh, p/w fever, dysuria, left flank pain; in er, c/f uti, r/o ?late postpartum endometritis, ?ssi; admit to medicine for further mgmt
32yo 61kg f, recent spontaneous vaginal delivery 10/9/23,  w no known pmh, p/w fever, dysuria, left flank pain; in er, c/f uti, r/o ?late postpartum endometritis, ?ssi; admit to medicine for further mgmt

## 2023-11-03 NOTE — PROGRESS NOTE ADULT - PROBLEM SELECTOR PLAN 2
fever iso dysuria + left flank pain; +cvat  failed outpatient treatment w course of keflex  Ua with no nitrites, no bacteria , but pyuria with leukocyte esterase; likely confounded by recent abx use  s/p 1 L lr bolus + ctx  Bld cx NGTD (prelim)  c/w CTX  f/up urine cx  antipyretics, antiemetics, analgesics as needed  encourage po intake; judicious ivf + lytes as needed in the mean time ; K 3.2 ; will replete
fever iso dysuria + left flank pain; +cvat  failed outpatient treatment w course of keflex  Ua with no nitrites, no bacteria , but pyuria with leukocyte esterase; likely confounded by recent abx use  s/p 1 L lr bolus + ctx  Bld cx NGTD x 48h (prelim)  Urine cx NGTD  given c/o worsening L flank pain with ++L CVA tenderness on exam; will check CTAP w/IV contrast to r/o abscess  c/w CTX for now

## 2023-11-04 ENCOUNTER — TRANSCRIPTION ENCOUNTER (OUTPATIENT)
Age: 33
End: 2023-11-04

## 2023-11-04 VITALS
SYSTOLIC BLOOD PRESSURE: 104 MMHG | HEART RATE: 78 BPM | TEMPERATURE: 98 F | DIASTOLIC BLOOD PRESSURE: 71 MMHG | RESPIRATION RATE: 18 BRPM | OXYGEN SATURATION: 97 %

## 2023-11-04 PROCEDURE — 85025 COMPLETE CBC W/AUTO DIFF WBC: CPT

## 2023-11-04 PROCEDURE — 80048 BASIC METABOLIC PNL TOTAL CA: CPT

## 2023-11-04 PROCEDURE — 99285 EMERGENCY DEPT VISIT HI MDM: CPT

## 2023-11-04 PROCEDURE — 84100 ASSAY OF PHOSPHORUS: CPT

## 2023-11-04 PROCEDURE — 85730 THROMBOPLASTIN TIME PARTIAL: CPT

## 2023-11-04 PROCEDURE — 80061 LIPID PANEL: CPT

## 2023-11-04 PROCEDURE — 85018 HEMOGLOBIN: CPT

## 2023-11-04 PROCEDURE — 84295 ASSAY OF SERUM SODIUM: CPT

## 2023-11-04 PROCEDURE — 82947 ASSAY GLUCOSE BLOOD QUANT: CPT

## 2023-11-04 PROCEDURE — 83735 ASSAY OF MAGNESIUM: CPT

## 2023-11-04 PROCEDURE — 85027 COMPLETE CBC AUTOMATED: CPT

## 2023-11-04 PROCEDURE — 99239 HOSP IP/OBS DSCHRG MGMT >30: CPT

## 2023-11-04 PROCEDURE — 74177 CT ABD & PELVIS W/CONTRAST: CPT

## 2023-11-04 PROCEDURE — 83605 ASSAY OF LACTIC ACID: CPT

## 2023-11-04 PROCEDURE — 82803 BLOOD GASES ANY COMBINATION: CPT

## 2023-11-04 PROCEDURE — 85014 HEMATOCRIT: CPT

## 2023-11-04 PROCEDURE — 36415 COLL VENOUS BLD VENIPUNCTURE: CPT

## 2023-11-04 PROCEDURE — 96375 TX/PRO/DX INJ NEW DRUG ADDON: CPT

## 2023-11-04 PROCEDURE — 81001 URINALYSIS AUTO W/SCOPE: CPT

## 2023-11-04 PROCEDURE — 80053 COMPREHEN METABOLIC PANEL: CPT

## 2023-11-04 PROCEDURE — 82330 ASSAY OF CALCIUM: CPT

## 2023-11-04 PROCEDURE — 96374 THER/PROPH/DIAG INJ IV PUSH: CPT

## 2023-11-04 PROCEDURE — 76770 US EXAM ABDO BACK WALL COMP: CPT

## 2023-11-04 PROCEDURE — 87040 BLOOD CULTURE FOR BACTERIA: CPT

## 2023-11-04 PROCEDURE — 82435 ASSAY OF BLOOD CHLORIDE: CPT

## 2023-11-04 PROCEDURE — 87086 URINE CULTURE/COLONY COUNT: CPT

## 2023-11-04 PROCEDURE — 84132 ASSAY OF SERUM POTASSIUM: CPT

## 2023-11-04 PROCEDURE — 83036 HEMOGLOBIN GLYCOSYLATED A1C: CPT

## 2023-11-04 PROCEDURE — 85610 PROTHROMBIN TIME: CPT

## 2023-11-04 PROCEDURE — 84443 ASSAY THYROID STIM HORMONE: CPT

## 2023-11-04 RX ORDER — LANOLIN ALCOHOL/MO/W.PET/CERES
1 CREAM (GRAM) TOPICAL
Qty: 0 | Refills: 0 | DISCHARGE
Start: 2023-11-04

## 2023-11-04 RX ADMIN — Medication 1 TABLET(S): at 05:45

## 2023-11-04 NOTE — DISCHARGE NOTE PROVIDER - NSDCMRMEDTOKEN_GEN_ALL_CORE_FT
melatonin 3 mg oral tablet: 1 tab(s) orally once a day (at bedtime) As needed Insomnia   amoxicillin-clavulanate 875 mg-125 mg oral tablet: 1 tab(s) orally 2 times a day to be completed on 11/11  melatonin 3 mg oral tablet: 1 tab(s) orally once a day (at bedtime) As needed Insomnia

## 2023-11-04 NOTE — DISCHARGE NOTE PROVIDER - ATTENDING DISCHARGE PHYSICAL EXAMINATION:
Seen with  at bedside. Pt reports pain improved and feeling better. PE well appearing female, ambulating, in NAD, lungs cta, abd sntnd, no CVAT, legs without edema.  CTAP reviewed and discussed with pt confirms pyelonephritis.  Stable for DC home today w outpt PCP f/u in 2 weeks.  All questions answered at bedside.

## 2023-11-04 NOTE — DISCHARGE NOTE PROVIDER - HOSPITAL COURSE
HPI:  34yo 61kg f, recent spontaneous vaginal delivery 10/9/23,  w no known pmh, p/w fever, dysuria, left flank pain; Dysuria started approximately 1 week ago.  Was seen by PMD today who prescribed Keflex and told patient to come to ER if fevers persisted.  Fevers temporarily improved with Tylenol but then returned, along with flank pain, prompting patient to present to [facility] er for further evaluation.    of note, h/o recent spontaneous vaginal delivery 10/9/23; req episiotomy/small RML and c/b 3rd degree laceration req suture repair.    in er, c/f uti, r/o ?late postpartum endometritis, ?ssi; admit to medicine for further mgmt (01 Nov 2023 00:07)    Hospital Course:      Important Medication Changes and Reason:    Active or Pending Issues Requiring Follow-up:    Advanced Directives:   [x ] Full code  [ ] DNR  [ ] Hospice    Discharge Diagnoses:         HPI:  32yo 61kg f, recent spontaneous vaginal delivery 10/9/23,  w no known pmh, p/w fever, dysuria, left flank pain; Dysuria started approximately 1 week ago.  Was seen by PMD today who prescribed Keflex and told patient to come to ER if fevers persisted.  Fevers temporarily improved with Tylenol but then returned, along with flank pain, prompting patient to present to [facility] er for further evaluation.    of note, h/o recent spontaneous vaginal delivery 10/9/23; req episiotomy/small RML and c/b 3rd degree laceration req suture repair.    in er, c/f uti, r/o ?late postpartum endometritis, ?ssi; admit to medicine for further mgmt (01 Nov 2023 00:07)    Hospital Course:  Sepsis. h/o recent spontaneous vaginal delivery 10/9/23 (req episiotomy/small RML and c/b 3rd degree laceration req suture repair)  source; based on work up thus far, suspecting uti, r/o ?late postpartum endometritis, ?ssi (over episiotomy/small RML cut vs over stitched 3rd degree laceration)  No acute gyn interventions warranted at this time.   Bld cx NGTD x 48h (prelim) Urine cx NGTD  pt however c/o worsening left flank pain with ++L CVA tenderness on exam  c/w CTX for now with transition to AUgmentin 875 BID to finish on 11/11   check CTAP w/IV contrast to r/o abscess.   IMPRESSION:  Multifocal regions of cortical hypoenhancement within the left kidney,   compatible with pyelonephritis. No hydronephrosis or renal calculus. No   abscess or perinephric fluid collection. Close follow-up to confirm   resolution is advised. The right kidney is unremarkable.  Concentric wall thickening of the urinary bladder with mild perivesical   inflammatory changes compatible with infection/cystitis.  Small volume pelvic ascites.  Additional findings, as above.  Findings were discussed with NP Kyler by Dr. Bingham on 11/3/2023 at 6:18 PM.    Acute UTI.   fever iso dysuria + left flank pain; +cvat  failed outpatient treatment w course of keflex  Ua with no nitrites, no bacteria , but pyuria with leukocyte esterase; likely confounded by recent abx use  Bld cx NGTD x 48h (prelim)  Urine cx NGTD  given c/o worsening L flank pain with ++L CVA tenderness on exam; will check CTAP w/IV contrast to r/o abscess noted above   c/w CTX for now.with transition to Augmentin       Important Medication Changes and Reason:  c/w Augmentin     Active or Pending Issues Requiring Follow-up:    Advanced Directives:   [x ] Full code  [ ] DNR  [ ] Hospice    Discharge Diagnoses:         HPI:  32yo 61kg f, recent spontaneous vaginal delivery 10/9/23,  w no known pmh, p/w fever, dysuria, left flank pain; Dysuria started approximately 1 week ago.  Was seen by PMD today who prescribed Keflex and told patient to come to ER if fevers persisted.  Fevers temporarily improved with Tylenol but then returned, along with flank pain, prompting patient to present to [facility] er for further evaluation.    of note, h/o recent spontaneous vaginal delivery 10/9/23; req episiotomy/small RML and c/b 3rd degree laceration req suture repair.    in er, c/f uti, r/o ?late postpartum endometritis, ?ssi; admit to medicine for further mgmt (01 Nov 2023 00:07)    Hospital Course:  Ot admitted with sepsis due to pyelonephritis, started on iv abx with improvement per ID recs. She had failed course of outpt po keflex. Given worsened L flank pain, CTAP done confirmed pyelo without abscess and pt's symptoms continued to improve and she is stable for dc home w augmentin BID to finish on 11/11 and f/u PCP in 2 weeks. Sepsis resolved - afebrile, leukocytosis normalized. She was noted hypokalemic occasionally which improved s/p supplementation. Ucx neg likely in setting of recent abx. Bld cx NGTD.      Important Medication Changes and Reason:  c/w Augmentin thur 11/11    Active or Pending Issues Requiring Follow-up:    Advanced Directives:   [x ] Full code  [ ] DNR  [ ] Hospice    Discharge Diagnoses:  sepsis  pyelonephritis  hypokalemia

## 2023-11-04 NOTE — DISCHARGE NOTE NURSING/CASE MANAGEMENT/SOCIAL WORK - NSDCPNINST_GEN_ALL_CORE
Call for follow up appointments. Any fever, shortness of breath, pain, or bleeding, notify doctor and if severe call 911 and present to ER.

## 2023-11-04 NOTE — DISCHARGE NOTE PROVIDER - NSDCFUADDAPPT_GEN_ALL_CORE_FT
APPTS ARE READY TO BE MADE: [x ] YES    Best Family or Patient Contact (if needed):    Additional Information about above appointments (if needed):    1:   2:   3:     Other comments or requests:    APPTS ARE READY TO BE MADE: [x ] YES    Best Family or Patient Contact (if needed):    Additional Information about above appointments (if needed):    1:   2:   3:     Other comments or requests:     Patient was previously scheduled to see Dr. Patel on 11/17 at [address] [place] [phone]

## 2023-11-04 NOTE — DISCHARGE NOTE NURSING/CASE MANAGEMENT/SOCIAL WORK - NSDCPNDISPN_GEN_ALL_CORE
Tele strip at 1306 shows sinus rhythm.      Measurements from am strip were as follows:  ID=0.18  QRS=0.08  QT=0.36    Tele Shift Summary:    Rhythm : Sinus rhythm  Rate :   Ectopy : Per CCT terry Dickerson had .     Telemetry monitoring strips placed in pt's chart.                Opioids not applicable/not prescribed

## 2023-11-04 NOTE — DISCHARGE NOTE NURSING/CASE MANAGEMENT/SOCIAL WORK - PATIENT PORTAL LINK FT
You can access the FollowMyHealth Patient Portal offered by [facility] by registering at the following website: [email or web address]. By joining [facility]’s FollowMyHealth portal, you will also be able to view your health information using other applications (apps) compatible with our system.

## 2023-11-04 NOTE — DISCHARGE NOTE NURSING/CASE MANAGEMENT/SOCIAL WORK - NSDCVIVACCINE_GEN_ALL_CORE_FT
MMR; 11-Oct-2023 13:43; Nanette Gee (RN); [employer] [id number] (Exp. Date: 08-Sep-2024); SubCutaneous; Arm Left; 0.5 milliLiter(s); VIS (VIS Published: 20-Apr-2012, VIS Presented: 11-Oct-2023);

## 2023-11-04 NOTE — DISCHARGE NOTE PROVIDER - CARE PROVIDER_API CALL
Eusebio Patel  [facility]  [address], [address]  [place] [address]  Phone: ([phone]  Fax: [phone]  Follow Up Time:    Eusebio Patel  [facility]  [address], [address]  [place] [address]  Phone: ()-  Fax: ()-  Follow Up Time: 2 weeks

## 2023-11-04 NOTE — DISCHARGE NOTE PROVIDER - NSDCCPCAREPLAN_GEN_ALL_CORE_FT
PRINCIPAL DISCHARGE DIAGNOSIS  Diagnosis: Pyelonephritis  Assessment and Plan of Treatment:      PRINCIPAL DISCHARGE DIAGNOSIS  Diagnosis: Sepsis  Assessment and Plan of Treatment: h/o recent spontaneous vaginal delivery 10/9/23 (req episiotomy/small RML and c/b 3rd degree laceration req suture repair)  Seen by OBGYN in ED ; No acute gyn interventions warranted at this time.   Bld cx NGTD x 48h (prelim)  Urine cx NGTD  pt however c/o worsening left flank pain with ++L CVA tenderness on exam c/w CTX for now and transition to Augmentin til 11/11  check CTAP w/IV contrast to r/o abscess.        SECONDARY DISCHARGE DIAGNOSES  Diagnosis: Acute UTI  Assessment and Plan of Treatment: failed outpatient treatment w course of keflex  Ua with no nitrites, no bacteria , but pyuria with leukocyte esterase; likely confounded by recent abx use  Bld cx NGTD x 48h (prelim)  HOME CARE INSTRUCTIONS  If you were prescribed antibiotics, take them exactly as your caregiver instructs you. Finish the medication even if you feel better after you have only taken some of the medication.  Drink enough water and fluids to keep your urine clear or pale yellow.  Avoid caffeine, tea, and carbonated beverages. They tend to irritate your bladder.  Empty your bladder often. Avoid holding urine for long periods of time.  After a bowel movement, women should cleanse from front to back. Use each tissue only once.  SEEK MEDICAL CARE IF:  You have back pain.  You develop a fever.  Your symptoms do not begin to resolve within 3 days.  SEEK IMMEDIATE MEDICAL CARE IF:  You have severe back pain or lower abdominal pain.  You develop chills.  You have nausea or vomiting.  You have continued burning or discomfort with urination.       PRINCIPAL DISCHARGE DIAGNOSIS  Diagnosis: Sepsis  Assessment and Plan of Treatment: you had sepsis due to UTI that spread from your bladder to your left kidney. you improved with iv antibiotics and will complete oral antibiotics and follow up with your PCP.        SECONDARY DISCHARGE DIAGNOSES  Diagnosis: Acute UTI  Assessment and Plan of Treatment: HOME CARE INSTRUCTIONS  Finish the medication even if you feel better after you have only taken some of the medication.  Drink enough water and fluids to keep your urine clear or pale yellow.  Avoid caffeine, tea, and carbonated beverages. They tend to irritate your bladder.  Empty your bladder often. Avoid holding urine for long periods of time.  After a bowel movement, women should cleanse from front to back. Use each tissue only once.  SEEK MEDICAL CARE IF:  You have worsening back pain.  You develop a fever.  Your symptoms do not begin to resolve within 3 days.  SEEK IMMEDIATE MEDICAL CARE IF:  You have severe back pain or lower abdominal pain.  You develop chills.  You have nausea or vomiting.  You have continued burning or discomfort with urination.

## 2023-11-06 LAB
CULTURE RESULTS: SIGNIFICANT CHANGE UP
SPECIMEN SOURCE: SIGNIFICANT CHANGE UP

## 2024-01-23 NOTE — PATIENT PROFILE OB - BREAST MILK PROVIDES COLOSTRUM THAT IS HIGH IN PROTEIN
Please use zofran as needed for nausea. Please go to ED if you develop worsening symptoms or you are unable to keep fluids or food down.      Statement Selected

## 2024-03-25 NOTE — OB RN PATIENT PROFILE - HBSAG: DATE, OB PROFILE
Medication: aviane 0.1-20 mg-mcg per tablet passed protocol.    Last office visit date: 5/12/23  Future appointment 7/19/24  Next appointment scheduled?: Yes   Number of refills given: 3   Name from pharmacy: N[facility] L[facility]ler 0.1-20 MG-MCG Oral Tablet         Will file in chart as: Aviane 0.1-20 MG-MCG per tablet    Sig: Take 1 tablet by mouth once daily    Disp: 28 tablet    Refills: 0    Start: 3/24/2024    Class: Eprescribe    Non-formulary    Last ordered: 1 year ago (3/1/2023) by Francine Proctor NP    Last refill: 3/1/2024    Rx #: [id number]    Oral Contraceptives Refill Protocol - 12 Month Protocol Hunmom3903/24/2024 07:22 AM   Protocol Details Seen by prescribing provider or same department within the last 12 months or has a future appt in 3 months -  -- IF CRITERIA FAILED REFER TO PROTOCOL DETAILS    Patient is not pregnant    Medication (including dose and sig) on current meds list      To be filled at: [address] [place] [address]
07-Mar-2023

## 2024-09-11 NOTE — OB RN TRIAGE NOTE - NS PRO ABUSE SCREEN SUSPICION NEGLECT YN
unable to assess
Communicate risk of Fall with Harm to all staff, patient, and family/Provide visual cue: red socks, yellow wristband, yellow gown, etc/Reinforce activity limits and safety measures with patient and family/Bed in lowest position, wheels locked, appropriate side rails in place/Call bell, personal items and telephone in reach/Instruct patient to call for assistance before getting out of bed/chair/stretcher/Non-slip footwear applied when patient is off stretcher/[place] to call system/Physically safe environment - no spills, clutter or unnecessary equipment/Purposeful Proactive Rounding/Room/bathroom lighting operational, light cord in reach

## 2025-03-06 ENCOUNTER — NON-APPOINTMENT (OUTPATIENT)
Age: 35
End: 2025-03-06

## 2025-03-12 NOTE — OB RN PATIENT PROFILE - FUNCTIONAL SCREEN CURRENT LEVEL: SWALLOWING (IF SCORE 2 OR MORE FOR ANY ITEM, CONSULT REHAB SERVICES), MLM)
Pt requesting refills of OCP. Annual scheduled for 7/23/25.  
0 = swallows foods/liquids without difficulty

## 2025-03-30 ENCOUNTER — INPATIENT (INPATIENT)
Facility: HOSPITAL | Age: 35
LOS: 3 days | Discharge: HOME | DRG: 833 | End: 2025-04-03
Attending: STUDENT IN AN ORGANIZED HEALTH CARE EDUCATION/TRAINING PROGRAM | Admitting: STUDENT IN AN ORGANIZED HEALTH CARE EDUCATION/TRAINING PROGRAM
Payer: COMMERCIAL

## 2025-03-30 VITALS
TEMPERATURE: 98 F | DIASTOLIC BLOOD PRESSURE: 65 MMHG | OXYGEN SATURATION: 100 % | HEIGHT: 62 IN | HEART RATE: 85 BPM | RESPIRATION RATE: 20 BRPM | WEIGHT: 130.07 LBS | SYSTOLIC BLOOD PRESSURE: 97 MMHG

## 2025-03-30 DIAGNOSIS — Z98.890 OTHER SPECIFIED POSTPROCEDURAL STATES: Chronic | ICD-10-CM

## 2025-03-30 LAB
ALBUMIN SERPL ELPH-MCNC: 3.6 G/DL — SIGNIFICANT CHANGE UP (ref 3.3–5)
ALP SERPL-CCNC: 43 U/L — SIGNIFICANT CHANGE UP (ref 40–120)
ALT FLD-CCNC: 9 U/L — LOW (ref 10–45)
ANION GAP SERPL CALC-SCNC: 10 MMOL/L — SIGNIFICANT CHANGE UP (ref 5–17)
APPEARANCE UR: CLEAR — SIGNIFICANT CHANGE UP
AST SERPL-CCNC: 12 U/L — SIGNIFICANT CHANGE UP (ref 10–40)
BACTERIA # UR AUTO: NEGATIVE /HPF — SIGNIFICANT CHANGE UP
BASOPHILS # BLD AUTO: 0.05 K/UL — SIGNIFICANT CHANGE UP (ref 0–0.2)
BASOPHILS NFR BLD AUTO: 0.7 % — SIGNIFICANT CHANGE UP (ref 0–2)
BILIRUB SERPL-MCNC: 0.3 MG/DL — SIGNIFICANT CHANGE UP (ref 0.2–1.2)
BILIRUB UR-MCNC: NEGATIVE — SIGNIFICANT CHANGE UP
BUN SERPL-MCNC: 15 MG/DL — SIGNIFICANT CHANGE UP (ref 7–23)
CALCIUM SERPL-MCNC: 8.5 MG/DL — SIGNIFICANT CHANGE UP (ref 8.4–10.5)
CAST: 2 /LPF — SIGNIFICANT CHANGE UP (ref 0–4)
CHLORIDE SERPL-SCNC: 104 MMOL/L — SIGNIFICANT CHANGE UP (ref 96–108)
CO2 SERPL-SCNC: 21 MMOL/L — LOW (ref 22–31)
COLOR SPEC: ABNORMAL
CREAT SERPL-MCNC: 0.71 MG/DL — SIGNIFICANT CHANGE UP (ref 0.5–1.3)
DIFF PNL FLD: ABNORMAL
EGFR: 113 ML/MIN/1.73M2 — SIGNIFICANT CHANGE UP
EGFR: 113 ML/MIN/1.73M2 — SIGNIFICANT CHANGE UP
EOSINOPHIL # BLD AUTO: 0.4 K/UL — SIGNIFICANT CHANGE UP (ref 0–0.5)
EOSINOPHIL NFR BLD AUTO: 5.7 % — SIGNIFICANT CHANGE UP (ref 0–6)
FLUAV AG NPH QL: SIGNIFICANT CHANGE UP
FLUBV AG NPH QL: SIGNIFICANT CHANGE UP
GAS PNL BLDV: SIGNIFICANT CHANGE UP
GLUCOSE SERPL-MCNC: 153 MG/DL — HIGH (ref 70–99)
GLUCOSE UR QL: NEGATIVE MG/DL — SIGNIFICANT CHANGE UP
HCG SERPL-ACNC: HIGH MIU/ML
HCT VFR BLD CALC: 25.5 % — LOW (ref 34.5–45)
HCT VFR BLD CALC: 29.3 % — LOW (ref 34.5–45)
HGB BLD-MCNC: 8.6 G/DL — LOW (ref 11.5–15.5)
HGB BLD-MCNC: 9.9 G/DL — LOW (ref 11.5–15.5)
IMM GRANULOCYTES NFR BLD AUTO: 0.3 % — SIGNIFICANT CHANGE UP (ref 0–0.9)
KETONES UR-MCNC: NEGATIVE MG/DL — SIGNIFICANT CHANGE UP
LEUKOCYTE ESTERASE UR-ACNC: ABNORMAL
LIDOCAIN IGE QN: 28 U/L — SIGNIFICANT CHANGE UP (ref 7–60)
LYMPHOCYTES # BLD AUTO: 1.1 K/UL — SIGNIFICANT CHANGE UP (ref 1–3.3)
LYMPHOCYTES # BLD AUTO: 15.8 % — SIGNIFICANT CHANGE UP (ref 13–44)
MCHC RBC-ENTMCNC: 30.3 PG — SIGNIFICANT CHANGE UP (ref 27–34)
MCHC RBC-ENTMCNC: 30.7 PG — SIGNIFICANT CHANGE UP (ref 27–34)
MCHC RBC-ENTMCNC: 33.7 G/DL — SIGNIFICANT CHANGE UP (ref 32–36)
MCHC RBC-ENTMCNC: 33.8 G/DL — SIGNIFICANT CHANGE UP (ref 32–36)
MCV RBC AUTO: 89.8 FL — SIGNIFICANT CHANGE UP (ref 80–100)
MCV RBC AUTO: 90.7 FL — SIGNIFICANT CHANGE UP (ref 80–100)
MONOCYTES # BLD AUTO: 0.26 K/UL — SIGNIFICANT CHANGE UP (ref 0–0.9)
MONOCYTES NFR BLD AUTO: 3.7 % — SIGNIFICANT CHANGE UP (ref 2–14)
NEUTROPHILS # BLD AUTO: 5.15 K/UL — SIGNIFICANT CHANGE UP (ref 1.8–7.4)
NEUTROPHILS NFR BLD AUTO: 73.8 % — SIGNIFICANT CHANGE UP (ref 43–77)
NITRITE UR-MCNC: NEGATIVE — SIGNIFICANT CHANGE UP
NRBC BLD AUTO-RTO: 0 /100 WBCS — SIGNIFICANT CHANGE UP (ref 0–0)
NRBC BLD AUTO-RTO: 0 /100 WBCS — SIGNIFICANT CHANGE UP (ref 0–0)
PH UR: 6 — SIGNIFICANT CHANGE UP (ref 5–8)
PLATELET # BLD AUTO: 215 K/UL — SIGNIFICANT CHANGE UP (ref 150–400)
PLATELET # BLD AUTO: 219 K/UL — SIGNIFICANT CHANGE UP (ref 150–400)
POTASSIUM SERPL-MCNC: 3.4 MMOL/L — LOW (ref 3.5–5.3)
POTASSIUM SERPL-SCNC: 3.4 MMOL/L — LOW (ref 3.5–5.3)
PROT SERPL-MCNC: 6.2 G/DL — SIGNIFICANT CHANGE UP (ref 6–8.3)
PROT UR-MCNC: 30 MG/DL
RBC # BLD: 2.84 M/UL — LOW (ref 3.8–5.2)
RBC # BLD: 3.23 M/UL — LOW (ref 3.8–5.2)
RBC # FLD: 13.2 % — SIGNIFICANT CHANGE UP (ref 10.3–14.5)
RBC # FLD: 13.2 % — SIGNIFICANT CHANGE UP (ref 10.3–14.5)
RBC CASTS # UR COMP ASSIST: 1506 /HPF — HIGH (ref 0–4)
RSV RNA NPH QL NAA+NON-PROBE: SIGNIFICANT CHANGE UP
SARS-COV-2 RNA SPEC QL NAA+PROBE: SIGNIFICANT CHANGE UP
SODIUM SERPL-SCNC: 135 MMOL/L — SIGNIFICANT CHANGE UP (ref 135–145)
SOURCE RESPIRATORY: SIGNIFICANT CHANGE UP
SP GR SPEC: 1.01 — SIGNIFICANT CHANGE UP (ref 1–1.03)
SQUAMOUS # UR AUTO: 1 /HPF — SIGNIFICANT CHANGE UP (ref 0–5)
UROBILINOGEN FLD QL: 0.2 MG/DL — SIGNIFICANT CHANGE UP (ref 0.2–1)
WBC # BLD: 6.98 K/UL — SIGNIFICANT CHANGE UP (ref 3.8–10.5)
WBC # BLD: 9.34 K/UL — SIGNIFICANT CHANGE UP (ref 3.8–10.5)
WBC # FLD AUTO: 6.98 K/UL — SIGNIFICANT CHANGE UP (ref 3.8–10.5)
WBC # FLD AUTO: 9.34 K/UL — SIGNIFICANT CHANGE UP (ref 3.8–10.5)
WBC UR QL: 3 /HPF — SIGNIFICANT CHANGE UP (ref 0–5)

## 2025-03-30 PROCEDURE — 76770 US EXAM ABDO BACK WALL COMP: CPT | Mod: 26

## 2025-03-30 PROCEDURE — 93010 ELECTROCARDIOGRAM REPORT: CPT

## 2025-03-30 PROCEDURE — 88305 TISSUE EXAM BY PATHOLOGIST: CPT | Mod: 26

## 2025-03-30 PROCEDURE — 99285 EMERGENCY DEPT VISIT HI MDM: CPT

## 2025-03-30 PROCEDURE — 76817 TRANSVAGINAL US OBSTETRIC: CPT | Mod: 26

## 2025-03-30 RX ORDER — SODIUM CHLORIDE 9 G/1000ML
1000 INJECTION, SOLUTION INTRAVENOUS
Refills: 0 | Status: DISCONTINUED | OUTPATIENT
Start: 2025-03-30 | End: 2025-04-01

## 2025-03-30 RX ORDER — SODIUM CHLORIDE 9 G/1000ML
1000 INJECTION, SOLUTION INTRAVENOUS ONCE
Refills: 0 | Status: COMPLETED | OUTPATIENT
Start: 2025-03-30 | End: 2025-03-30

## 2025-03-30 RX ORDER — METHOTREXATE 25 MG/ML
75 INJECTION, SOLUTION INTRA-ARTERIAL; INTRAMUSCULAR; INTRATHECAL; INTRAVENOUS ONCE
Refills: 0 | Status: COMPLETED | OUTPATIENT
Start: 2025-03-30 | End: 2025-03-31

## 2025-03-30 RX ADMIN — SODIUM CHLORIDE 1000 MILLILITER(S): 9 INJECTION, SOLUTION INTRAVENOUS at 20:49

## 2025-03-30 RX ADMIN — Medication 1000 MILLILITER(S): at 16:53

## 2025-03-30 RX ADMIN — Medication 1000 MILLILITER(S): at 15:53

## 2025-03-30 NOTE — H&P ADULT - ASSESSMENT
Assessment: 35 yo  at 6w0d by LMP 2/15 presenting for heavy vaginal bleeding.     Plan:   - pending MFM recommendations  Assessment: 35 yo  at 6w0d by LMP 2/15 presenting for heavy vaginal bleeding.  On exam in ED, patient with normal vital signs and labs significant for downtrend in H/H 9.9/29.3->8.6/25.5. TVUS revealed live ectopic pregnancy at 6w2d by CRL 0.5cm. Physical exam notable for a minimal amount of active bleeding from external cervical os. Due to downtrending H/H, patient recieving 1uPRBC in ED. Overall, patient clinically stable at this time. Due to high suspicion for cervical ectopic pregnancy, patient to be admitted for further monitoring.     Plan:   #Cervical Ectopic Pregnancy   - Will admit for continued monitoring   - 2hr post-transfusion CBC   - MFM consulted, appreciate recommendations   - IR made aware of patient, should decompensation occur overnight       Neuro: IV pain maids   CV: Hemodynamically stable  Pulm: Saturating well on room air   GI: NPO @MN   :. Voiding spontaneously  Heme:  SCDs for DVT ppx  FEN: LR@125.  replete electrolytes prn   ID: Afebrile  Endo: No active issues   Dispo: Admit to floor     [place]aoi, PGY-1   Assessment: 35 yo  at 6w0d by LMP 15 presenting for heavy vaginal bleeding.  On exam in ED, patient with normal vital signs and labs significant for downtrend in H/H 9.9/29.3->8.6/25.5. TVUS revealed live ectopic pregnancy at 6w2d by CRL 0.5cm. Physical exam notable for a minimal amount of active bleeding from external cervical os. Due to downtrending H/H, patient recieving 1uPRBC in ED. Overall, patient clinically stable at this time. However, due to high suspicion for cervical ectopic pregnancy, patient to be admitted for further monitoring.     Plan:   #Cervical Ectopic Pregnancy   - Admit to floor   - 2hr post-transfusion CBC   - Per MFM reccomendations: IM Methotrexate to be given tonight. MFM to see patient in the AM.   - IR made aware of patient, should decompensation occur overnight. Formal IR consultation in the AM.       Neuro: IV pain maids   CV: Hemodynamically stable  Pulm: Saturating well on room air   GI: NPO @MN   :. Voiding spontaneously  Heme:  SCDs for DVT ppx  FEN: LR@125.  replete electrolytes prn   ID: Afebrile  Endo: No active issues   Dispo: Admit to floor     AChoi, PGY-1   Assessment: 33 yo  at 6w0d by LMP 2/15 presenting for heavy vaginal bleeding.  On exam in ED, patient with normal vital signs and labs significant for downtrend in H/H 9.9/29.3->8.6/25.5. TVUS revealed concern for cervical ectopic pregnancy. Physical exam notable for a minimal bleeding from external cervical os. 1uPRBC being infused by ED. Overall, patient clinically stable at this time. However, due to high suspicion for cervical ectopic pregnancy, patient to be admitted for further monitoring.     Plan:   #Cervical Ectopic Pregnancy   - Admit to floor   - 2hr post-transfusion CBC   - Pad counts  - Per MFM recommendations IM Methotrexate to be given tonight. MFM to see patient in the AM for repeat ultrasound. Possibly candidate for cervical fragoso balloon vs. UAE followed by D&C.   - IR made aware of patient for possible UAE should decompensation occur overnight. Formal IR consultation in the AM.       Neuro: IV pain maids   CV: Hemodynamically stable  Pulm: Saturating well on room air   GI: NPO @MN   :. Voiding spontaneously  Heme:  SCDs for DVT ppx  FEN: LR@125.  replete electrolytes prn   ID: Afebrile  Endo: No active issues   Dispo: Admit to floor     AChoi, PGY-1

## 2025-03-30 NOTE — H&P ADULT - NSHPLABSRESULTS_GEN_ALL_CORE
LABS:                            8.6    9.34  )-----------( 215      ( 30 Mar 2025 19:55 )             25.5         135  |  104  |  15  ----------------------------<  153[H]  3.4[L]   |  21[L]  |  0.71    Ca    8.5      30 Mar 2025 15:36    TPro  6.2  /  Alb  3.6  /  TBili  0.3  /  DBili  x   /  AST  12  /  ALT  9[L]  /  AlkPhos  43      I&O's Detail      Urinalysis Basic - ( 30 Mar 2025 17:20 )    Color: Red / Appearance: Clear / S.014 / pH: x  Gluc: x / Ketone: Negative mg/dL  / Bili: Negative / Urobili: 0.2 mg/dL   Blood: x / Protein: 30 mg/dL / Nitrite: Negative   Leuk Esterase: Trace / RBC: 1506 /HPF / WBC 3 /HPF   Sq Epi: x / Non Sq Epi: 1 /HPF / Bacteria: Negative /HPF        RADIOLOGY & ADDITIONAL STUDIES:  ACC: [id number] EXAM:  US OB TRANSVAGINAL   ORDERED BY: MARIANNA SERRA     PROCEDURE DATE:  2025          INTERPRETATION:  CLINICAL INFORMATION: Vaginal bleeding. 6 weeks pregnant.    LMP: 2/15/2025    Estimated Gestational Age by LMP: 6 weeks 1 day    COMPARISON: Pelvic sonogram 2019    TECHNIQUE: Endovaginal and transabdominal pelvic sonogram. Color and   Spectral Doppler was performed.    FINDINGS:  Uterus: 9.1 x 6.9 x 6.0 cm. Anterior intramural intramural fibroid   measuring 3.0 x 3.6 x 3.2 cm. Cervical leiomyoma 4 cm versus vaginal   vault distended with blood. Endometrium 8 mm.      Ectopic live cervical implantation  Gestational Sac Size (mean): 1.3 cm  Crown Rump Length: 0.5 cm  Estimated Gestational Age: 6 weeks 2 days by crown rump length.  Yolk Sac: Normal  Fetal Heart Rate: 142    Right ovary: 3.6 x 2.5 x 2.4. Within normal limits. Ovarian cyst   measuring 2.8 x 2.3 x 2.5 cm.Normal arterial and venous waveforms.  Left ovary: 2.5 x 2.0 x 1.1. Within normal limits. Normal arterial and   venous waveforms.    Fluid: Small    IMPRESSION:  Live cervical ectopic  gestation of 6 weeks.        --- End of Report ---      DELANO FLORES MD; Resident Radiologist  This document has been electronically signed.  CAL CONTRERAS MD; Attending Radiologist  This document has been electronically signed. Mar 30 2025  8:02PM

## 2025-03-30 NOTE — H&P ADULT - NSHPPHYSICALEXAM_GEN_ALL_CORE
Vital Signs Last 24 Hrs  T(C): 36.8 (30 Mar 2025 21:15), Max: 36.8 (30 Mar 2025 21:15)  T(F): 98.2 (30 Mar 2025 21:15), Max: 98.2 (30 Mar 2025 21:15)  HR: 88 (30 Mar 2025 21:15) (74 - 88)  BP: 99/54 (30 Mar 2025 21:15) (93/61 - 105/92)  BP(mean): 71 (30 Mar 2025 19:56) (71 - 76)  RR: 16 (30 Mar 2025 21:15) (15 - 20)  SpO2: 100% (30 Mar 2025 21:15) (99% - 100%)    Parameters below as of 30 Mar 2025 21:15  Patient On (Oxygen Delivery Method): room air        Physical Exam:   General: sitting comfortably in bed, NAD   HEENT: neck supple, full ROM  CV: extremities well perfused  Lungs: normal respiratory effort on room air  Back: No CVA tenderness  Abd: Soft, non-tender, non-distended  :  No bleeding on pad.  External labia wnl. Bimanual exam with no cervical motion tenderness, uterus wnl, adnexa non palpable b/l.  Cervix closed vs. Cervix dilated  Speculum Exam: No active bleeding from os.  Ext: non-tender b/l, no edema Vital Signs Last 24 Hrs  T(C): 36.8 (30 Mar 2025 21:15), Max: 36.8 (30 Mar 2025 21:15)  T(F): 98.2 (30 Mar 2025 21:15), Max: 98.2 (30 Mar 2025 21:15)  HR: 88 (30 Mar 2025 21:15) (74 - 88)  BP: 99/54 (30 Mar 2025 21:15) (93/61 - 105/92)  BP(mean): 71 (30 Mar 2025 19:56) (71 - 76)  RR: 16 (30 Mar 2025 21:15) (15 - 20)  SpO2: 100% (30 Mar 2025 21:15) (99% - 100%)    Parameters below as of 30 Mar 2025 21:15  Patient On (Oxygen Delivery Method): room air        Physical Exam:   General: sitting comfortably in bed, NAD   HEENT: neck supple, full ROM  CV: extremities well perfused  Lungs: normal respiratory effort on room air  Back: No CVA tenderness  Abd: Soft, non-tender, non-distended  : Moderate bleeding on pad.  External labia wnl. Bimanual exam with no cervical motion tenderness, uterus wnl, adnexa non palpable b/l.  Cervix dilated at 1cm.   Speculum Exam: Bright red blood in vaginal vault. 50cc of blood/clot evacuated from vault. Cervix with small amount of blood trickling from os.   Ext: non-tender b/l, no edema

## 2025-03-30 NOTE — ED PROVIDER NOTE - CLINICAL SUMMARY MEDICAL DECISION MAKING FREE TEXT BOX
35 yo F w/ PMHx of pyelonephritis with abscess and miscarriage (20 weeks) with findings of vaginal polyp s/p D&C presents as a  GA 6w presents for 1 day of heavy vaginal bleeding (change soaked pads every 2 hours) with blood clots.  Vaginal bleeding started ~3:15 PM yesterday with new onset event including fall/trauma. ~1:30 PM today, she had an episode of SOB/dizziness/lightheadedness when she stood up to walk.  She presented to the ED for further evaluation.  LMP is 2/15 and she is 6 weeks pregnant confirmed with blood test with PCP.  However she has not confirmed pregnancy with ultrasound.  She endorses lower abdominal pain today and has been having left flank pain for the past month that has slightly improved.  She denies any fever/chills/cough/sore throat, CP, dysuria/hematuria, history of bleeding disorders.  Patient was offered vaginal exam, but patient chose to defer until ultrasound was performed and/or OB/GYN was consulted.    Gynecologist: Cara Mcclain  PCP: Eusebio Patel    Vital signs were initially remarkable for BP 97/65.  Physical exam is remarkable for suprapubic TTP.  Patient declined vaginal exam.  Concern for miscarriage versus subchorionic hematoma versus other intra-abdominal pathology causing pain.  Also concern for pyelonephritis in the setting of left-sided CVA tenderness for the past month. Plan for labs, transvaginal ultrasound, renal/bladder ultrasound, orthostatics, and blood transfusion as needed based on hemoglobin leve. Patient was already consented for blood transfusion.  Will consult OB/GYN if there is any abnormal findings on ultrasound requiring OB/GYN intervention/evaluation.

## 2025-03-30 NOTE — ED ADULT NURSE NOTE - OBJECTIVE STATEMENT
33 y/o female came to the ED with complaints of vaginal bleeding with clots starting approx. 24 hours PTA. Lightheadedness. Suprapubic cramping with radiating to her back and chest discomfort. 6 weeks pregnant, confirmed by an at home test and blood work done at her PCP. Denies SOB, headache, dizziness, fevers, chills, N, V, D, dysuria, hematuria, vaginal discharge.

## 2025-03-30 NOTE — H&P ADULT - HISTORY OF PRESENT ILLNESS
BILL LEI  34y  Female [id number]    HPI:    35 yo  at 6w0d by LMP 15 presenting for heavy vaginal bleeding. Reports bleeding began yesterday at approximately 3pm. Patient noticed bleeding when she went to bathroom and there was trickling blood. Today, patient noticed an increase in bleeding and had a pre-syncopal episode at approximately 1:30pm. Reports going through 1 pad every 1-2 hours. Reports dizziness/lightheadedness has improved. Denies CP/SOB/palpitations. Denies abdominal/vaginal pain or cramping. Denies N/V.   Patient last ate at 7:30pm.     Name of GYN Physician: Cara Mcclain   OBHx:    -  FT, , 6#3, c/b cervical insufficiency s/p Schmitt Cerclage @ 12w3d with removal at 37w, RML episiotomy and 3* laceration   - 2019 Previable PPROM at 20w s/p D&E    GynHx: Denies fibroids, cysts, endometriosis, STI's, Abnormal pap smears   PMH: Denies   PSH: D&E   Meds: Denies   Allx: NKDA   Social History:  Denies smoking use, drug use, alcohol use.

## 2025-03-30 NOTE — ED PROVIDER NOTE - PROGRESS NOTE DETAILS
Gume PGY3: Patient insisted on going to the bathroom despite advising against walking as she was previously hypotensive and is having active bleeding.  When patient returned from bathroom escorted by her , patient felt dizzy.  Blood pressure was measured to be 80s/50s (MAP 63).  Repeat CBC was sent and patient was consented for blood.  OB/GYN was consulted urgently because patient was still having active vaginal bleeding with pads every 2 hours. Patient was found to have left cervical ectopic pregnancy of 6 weeks on ultrasound with no abnormal findings on ultrasound of kidneys/bladder.  OB/GYN plans to take patient to the OR at this time.  Repeat hemoglobin shows decline in hemoglobin (9.9–8.6) after getting 1 L of fluids.  Will expedite blood and likely admit to OB/GYN. Gume PGY3: Patient insisted on going to the bathroom despite advising against walking as she was previously hypotensive and is having active bleeding.  When patient returned from bathroom escorted by her , patient felt dizzy.  Blood pressure was measured to be 80s/50s (MAP 63).  Repeat CBC was sent and patient was consented for blood.  OB/GYN was consulted urgently because patient was still having active vaginal bleeding with pads every 2 hours. Patient was found to have left cervical ectopic pregnancy of 6 weeks on ultrasound with no abnormal findings on ultrasound of kidneys/bladder.  OB/GYN plans to take patient to the OR at this time.  Repeat hemoglobin shows decline in hemoglobin (9.9–8.6) after getting 1 L of fluids.  Will expedite blood, give fluids in interim (per OBGYN), and admit to OBGYN.

## 2025-03-30 NOTE — ED ADULT NURSE REASSESSMENT NOTE - NS ED NURSE REASSESS COMMENT FT1
Pt received from DIONICIO Garza in purple. VSS, Pt A&O x3, baseline, Pt up to date with plan of care and has no questions currently, Pt is TBA and OB team bedside having discussion of OR vs IR. Pt stretcher at lowest position and locked with appropriate side rails up. Pt receiving unit of PRBC at this time

## 2025-03-30 NOTE — CHART NOTE - NSCHARTNOTEFT_GEN_A_CORE
MFM Phone Consultation     35yo  at 6w0d presenting with vaginal bleeding, with concern for cervical ectopic on ultrasound imaging. Patient history reviewed and confirmed from chart review (hx 20wk PPROM w/ D+E , followed by FT  with hx indicated cerclage in place in ). No other significant med/surg hx. Per Dr. Sam, primary OB, patient is presently clinically and hemodynamically stable. SVE with 15-20cc red blood removed from vault, with no active bleeding from os. Patient is being transfused 1uPRBC (Hgb 9.9->8.6) per ED team. Current VS: HR 88 BP 99/54. MFM consulted for review of images and recommendations for management with concern for possible cervical ectopic pregnancy.     Images reviewed with Dr. Luna, M Attending. It is difficult to discern based on images if this is a cervical ectopic or inevitable  (given early GA, there are no prior images of this pregnancy available).     Given the suspicion for cervical ectopic pregnancy, we recommend admission and treatment for intervention. Patient is presently clinically stable per inpatient team.     Recommendations:  - Intramuscular Methotrexate to be given tonight (50mg/m2)  - IR consultation for possible uterine artery embolization prior to proceeding OR for D+C  - Given size of gestational sac, intra-sac Methotrexate or KCL likely not feasible   - MFM to continue to follow, and if patient remains clinically stable, will consider fragoso balloon   - Patient to be made NPO at midnight for possible OR/IR tomorrow   - If there is a change in clinical status overnight and patient develops significant bleeding, patient would not be a safe candidate for medical management w/ fragoso balloon and we would recommend urgent surgical intervention with IR consultation for UAE and D+C    Plan made with MFM Attending, Dr. Luna  Plan discussed directly with Dr. Flaco Hernández, VASUM Fellow

## 2025-03-30 NOTE — ED PROVIDER NOTE - ATTENDING CONTRIBUTION TO CARE
PMD Cara Mcclain GYN [place]  34-year-old female no past med history comes to ER complains of heavy vaginal bleeding, patient for semester pregnancy LMP 2/15/2025 now presents with vaginal bleeding since yesterday with clots, some palm-sized associated with lightheadedness without syncope.  Patient denies chest pain, shortness of breath, palpitations, fever chills.  Has crampy abdominal pain suprapubic moderate more than her usual menses, pain radiates to back, there are no urinary symptoms.  Physical exam adult female awake alert GCS 15 looking mildly uncomfortable.  HEENT normocephalic/atraumatic, neck supple, sclera pale.  Chest clear A&P.  CV no rubs/murmur.  Abdomen  mild suprapubic tenderness without rebound guarding or masses.  No CVA tenderness.  Neuro GCS 15 speech fluent moves all extremities  Deandre Feng MD, Facep

## 2025-03-30 NOTE — ED PROVIDER NOTE - PHYSICAL EXAMINATION
GENERAL: no acute distress, ectomorphic body habitus  HEENT: atraumatic, normocephalic, vision grossly intact, EOMI, no conjunctivitis or discharge, hearing grossly intact, no nasal discharge or epistaxis, clear pharynx  CV: regular rate, normal rhythm, normal S1/S2, no murmurs/rubs, no cyanosis  PULM: normal work of breathing, normal O2 saturation on RA, clear breath sounds in b/l upper/lower lung fields, no crackles/rales/rhonchi/wheezing  GI: suprapubic TTP, soft/nondistended abdomen, no guarding or rebound tenderness, no palpable masses  : no CVA tenderness  NEURO: A&Ox4, follows commands, normal speech, no focal motor or sensory deficits  MSK: no joint tenderness/swelling/erythema, ranging all extremities with no appreciable loss of ROM  EXT: no peripheral edema, no calf tenderness, no redness or swelling  SKIN: warm, dry, and intact, no rashes  PSYCH: appropriate mood and affect

## 2025-03-30 NOTE — ED ADULT NURSE REASSESSMENT NOTE - NS ED NURSE REASSESS COMMENT FT1
Pt received from DIONICIO Garza in purple. VSS, Pt A&O x3, baseline, Pt up to date with plan of care and has no questions currently, Pt is TBA and OB team bedside having discussion of OR vs IR. Pt stretcher at lowest position and locked with appropriate side rails up. Pt receiving unit of PRBC at this time.

## 2025-03-30 NOTE — ED ADULT NURSE NOTE - TEMPLATE
OB/GYN [facility]o Pregnancy And Lactation Text: This medication has not been assigned a Pregnancy Risk Category. It is unknown if the medication is excreted in breast milk.

## 2025-03-31 ENCOUNTER — APPOINTMENT (OUTPATIENT)
Dept: ANTEPARTUM | Facility: CLINIC | Age: 35
End: 2025-03-31

## 2025-03-31 ENCOUNTER — ASOB RESULT (OUTPATIENT)
Age: 35
End: 2025-03-31

## 2025-03-31 LAB
ANION GAP SERPL CALC-SCNC: 11 MMOL/L — SIGNIFICANT CHANGE UP (ref 5–17)
APTT BLD: 27.4 SEC — SIGNIFICANT CHANGE UP (ref 24.5–35.6)
APTT BLD: 29 SEC — SIGNIFICANT CHANGE UP (ref 24.5–35.6)
BUN SERPL-MCNC: 9 MG/DL — SIGNIFICANT CHANGE UP (ref 7–23)
CALCIUM SERPL-MCNC: 8.1 MG/DL — LOW (ref 8.4–10.5)
CHLORIDE SERPL-SCNC: 108 MMOL/L — SIGNIFICANT CHANGE UP (ref 96–108)
CO2 SERPL-SCNC: 19 MMOL/L — LOW (ref 22–31)
CREAT SERPL-MCNC: 0.62 MG/DL — SIGNIFICANT CHANGE UP (ref 0.5–1.3)
CULTURE RESULTS: SIGNIFICANT CHANGE UP
EGFR: 118 ML/MIN/1.73M2 — SIGNIFICANT CHANGE UP
EGFR: 118 ML/MIN/1.73M2 — SIGNIFICANT CHANGE UP
FIBRINOGEN PPP-MCNC: 179 MG/DL — LOW (ref 200–445)
FIBRINOGEN PPP-MCNC: 190 MG/DL — LOW (ref 200–445)
GLUCOSE SERPL-MCNC: 87 MG/DL — SIGNIFICANT CHANGE UP (ref 70–99)
HCG SERPL-ACNC: 3947 MIU/ML — HIGH
HCT VFR BLD CALC: 23.1 % — LOW (ref 34.5–45)
HCT VFR BLD CALC: 23.5 % — LOW (ref 34.5–45)
HCT VFR BLD CALC: 27.3 % — LOW (ref 34.5–45)
HGB BLD-MCNC: 8 G/DL — LOW (ref 11.5–15.5)
HGB BLD-MCNC: 8.2 G/DL — LOW (ref 11.5–15.5)
HGB BLD-MCNC: 9 G/DL — LOW (ref 11.5–15.5)
INR BLD: 1.02 RATIO — SIGNIFICANT CHANGE UP (ref 0.85–1.16)
INR BLD: 1.04 RATIO — SIGNIFICANT CHANGE UP (ref 0.85–1.16)
MCHC RBC-ENTMCNC: 29.8 PG — SIGNIFICANT CHANGE UP (ref 27–34)
MCHC RBC-ENTMCNC: 30.9 PG — SIGNIFICANT CHANGE UP (ref 27–34)
MCHC RBC-ENTMCNC: 31.4 PG — SIGNIFICANT CHANGE UP (ref 27–34)
MCHC RBC-ENTMCNC: 33 G/DL — SIGNIFICANT CHANGE UP (ref 32–36)
MCHC RBC-ENTMCNC: 34.6 G/DL — SIGNIFICANT CHANGE UP (ref 32–36)
MCHC RBC-ENTMCNC: 34.9 G/DL — SIGNIFICANT CHANGE UP (ref 32–36)
MCV RBC AUTO: 89.2 FL — SIGNIFICANT CHANGE UP (ref 80–100)
MCV RBC AUTO: 90 FL — SIGNIFICANT CHANGE UP (ref 80–100)
MCV RBC AUTO: 90.4 FL — SIGNIFICANT CHANGE UP (ref 80–100)
NRBC BLD AUTO-RTO: 0 /100 WBCS — SIGNIFICANT CHANGE UP (ref 0–0)
PLATELET # BLD AUTO: 157 K/UL — SIGNIFICANT CHANGE UP (ref 150–400)
PLATELET # BLD AUTO: 165 K/UL — SIGNIFICANT CHANGE UP (ref 150–400)
PLATELET # BLD AUTO: 190 K/UL — SIGNIFICANT CHANGE UP (ref 150–400)
POTASSIUM SERPL-MCNC: 3.4 MMOL/L — LOW (ref 3.5–5.3)
POTASSIUM SERPL-SCNC: 3.4 MMOL/L — LOW (ref 3.5–5.3)
PROTHROM AB SERPL-ACNC: 11.7 SEC — SIGNIFICANT CHANGE UP (ref 9.9–13.4)
PROTHROM AB SERPL-ACNC: 11.9 SEC — SIGNIFICANT CHANGE UP (ref 9.9–13.4)
RBC # BLD: 2.59 M/UL — LOW (ref 3.8–5.2)
RBC # BLD: 2.61 M/UL — LOW (ref 3.8–5.2)
RBC # BLD: 3.02 M/UL — LOW (ref 3.8–5.2)
RBC # FLD: 13.2 % — SIGNIFICANT CHANGE UP (ref 10.3–14.5)
SODIUM SERPL-SCNC: 138 MMOL/L — SIGNIFICANT CHANGE UP (ref 135–145)
SPECIMEN SOURCE: SIGNIFICANT CHANGE UP
WBC # BLD: 5.79 K/UL — SIGNIFICANT CHANGE UP (ref 3.8–10.5)
WBC # BLD: 6.86 K/UL — SIGNIFICANT CHANGE UP (ref 3.8–10.5)
WBC # BLD: 9.18 K/UL — SIGNIFICANT CHANGE UP (ref 3.8–10.5)
WBC # FLD AUTO: 5.79 K/UL — SIGNIFICANT CHANGE UP (ref 3.8–10.5)
WBC # FLD AUTO: 6.86 K/UL — SIGNIFICANT CHANGE UP (ref 3.8–10.5)
WBC # FLD AUTO: 9.18 K/UL — SIGNIFICANT CHANGE UP (ref 3.8–10.5)

## 2025-03-31 PROCEDURE — 76815 OB US LIMITED FETUS(S): CPT | Mod: 26

## 2025-03-31 PROCEDURE — 36247 INS CATH ABD/L-EXT ART 3RD: CPT | Mod: 59,LT

## 2025-03-31 PROCEDURE — 76937 US GUIDE VASCULAR ACCESS: CPT | Mod: 26

## 2025-03-31 PROCEDURE — 37244 VASC EMBOLIZE/OCCLUDE BLEED: CPT

## 2025-03-31 PROCEDURE — 76817 TRANSVAGINAL US OBSTETRIC: CPT | Mod: 26

## 2025-03-31 RX ORDER — ONDANSETRON HCL/PF 4 MG/2 ML
4 VIAL (ML) INJECTION ONCE
Refills: 0 | Status: COMPLETED | OUTPATIENT
Start: 2025-03-31 | End: 2025-03-31

## 2025-03-31 RX ORDER — ACETAMINOPHEN 500 MG/5ML
1000 LIQUID (ML) ORAL EVERY 6 HOURS
Refills: 0 | Status: DISCONTINUED | OUTPATIENT
Start: 2025-03-31 | End: 2025-04-01

## 2025-03-31 RX ORDER — HYDROMORPHONE/SOD CHLOR,ISO/PF 2 MG/10 ML
0.5 SYRINGE (ML) INJECTION EVERY 4 HOURS
Refills: 0 | Status: DISCONTINUED | OUTPATIENT
Start: 2025-03-31 | End: 2025-04-01

## 2025-03-31 RX ORDER — KETOROLAC TROMETHAMINE 30 MG/ML
30 INJECTION, SOLUTION INTRAMUSCULAR; INTRAVENOUS ONCE
Refills: 0 | Status: DISCONTINUED | OUTPATIENT
Start: 2025-03-31 | End: 2025-03-31

## 2025-03-31 RX ORDER — HYDROMORPHONE/SOD CHLOR,ISO/PF 2 MG/10 ML
0.5 SYRINGE (ML) INJECTION
Refills: 0 | Status: DISCONTINUED | OUTPATIENT
Start: 2025-03-31 | End: 2025-03-31

## 2025-03-31 RX ORDER — DIPHENHYDRAMINE HCL 12.5MG/5ML
25 ELIXIR ORAL ONCE
Refills: 0 | Status: COMPLETED | OUTPATIENT
Start: 2025-03-31 | End: 2025-03-31

## 2025-03-31 RX ORDER — SIMETHICONE 80 MG
80 TABLET,CHEWABLE ORAL EVERY 8 HOURS
Refills: 0 | Status: DISCONTINUED | OUTPATIENT
Start: 2025-03-31 | End: 2025-04-03

## 2025-03-31 RX ORDER — ACETAMINOPHEN 500 MG/5ML
1000 LIQUID (ML) ORAL ONCE
Refills: 0 | Status: DISCONTINUED | OUTPATIENT
Start: 2025-03-31 | End: 2025-03-31

## 2025-03-31 RX ADMIN — KETOROLAC TROMETHAMINE 30 MILLIGRAM(S): 30 INJECTION, SOLUTION INTRAMUSCULAR; INTRAVENOUS at 18:48

## 2025-03-31 RX ADMIN — Medication 400 MILLIGRAM(S): at 21:34

## 2025-03-31 RX ADMIN — Medication 0.5 MILLIGRAM(S): at 16:23

## 2025-03-31 RX ADMIN — Medication 0.5 MILLIGRAM(S): at 16:47

## 2025-03-31 RX ADMIN — SODIUM CHLORIDE 125 MILLILITER(S): 9 INJECTION, SOLUTION INTRAVENOUS at 01:45

## 2025-03-31 RX ADMIN — SODIUM CHLORIDE 125 MILLILITER(S): 9 INJECTION, SOLUTION INTRAVENOUS at 08:39

## 2025-03-31 RX ADMIN — Medication 1000 MILLIGRAM(S): at 22:00

## 2025-03-31 RX ADMIN — METHOTREXATE 75 MILLIGRAM(S): 25 INJECTION, SOLUTION INTRA-ARTERIAL; INTRAMUSCULAR; INTRATHECAL; INTRAVENOUS at 01:38

## 2025-03-31 RX ADMIN — Medication 25 MILLIGRAM(S): at 18:22

## 2025-03-31 RX ADMIN — KETOROLAC TROMETHAMINE 30 MILLIGRAM(S): 30 INJECTION, SOLUTION INTRAMUSCULAR; INTRAVENOUS at 18:21

## 2025-03-31 RX ADMIN — Medication 4 MILLIGRAM(S): at 16:47

## 2025-03-31 RX ADMIN — SODIUM CHLORIDE 125 MILLILITER(S): 9 INJECTION, SOLUTION INTRAVENOUS at 23:02

## 2025-03-31 NOTE — PROVIDER CONTACT NOTE (OTHER) - SITUATION
Pt reports tingling in arms and legs and feeling cold in hands and feet. Only other complaint is abdominal pain, Tylenol hung 21:30. Per pt, it started after getting OOB for the first time after UAE.

## 2025-03-31 NOTE — PRE PROCEDURE NOTE - PRE PROCEDURE EVALUATION
Interventional Radiology    HPI: 34y Female with persistent vaginal bleeding i/s/o cervical ectopic s/p MTX 3/30 with gynecology team requesting embolization.     Allergies: No Known Drug Allergies  latex (Rash)    Medications (Abx/Cardiac/Anticoagulation/Blood Products)    Data:  T(C): 37.1  HR: 79  BP: 102/57  RR: 18  SpO2: 100%    Exam  General: No acute distress  Chest: Non labored breathing    -WBC 5.79 / HgB 8.0 / Hct 23.1 / Plt 165  -Na 138 / Cl 108 / BUN 9 / Glucose 87  -K 3.4 / CO2 19 / Cr 0.62  -INR1.02    Imaging: Reviewed    Plan: 34y Female presents for pelvic angiogram, possible embolization.   -Risks/Benefits/alternatives explained with the patient and/or healthcare proxy and witnessed informed consent obtained.

## 2025-03-31 NOTE — CHART NOTE - NSCHARTNOTEFT_GEN_A_CORE
Patient seen and examined at bedside 2-hours post-transfusion. Briefly, this is a 33 yo  at 6w1d () with downtrending H/H 2/2 acute blood loss anemia with possible cervical ectopic pregnancy. Patient denies dizziness/lightheadedness, CP/SOB/palpitations. Reports feeling better post-transfusion. Vaginal bleeding continues however improved.     Vital Signs Last 24 Hrs  T(C): 36.8 (31 Mar 2025 00:59), Max: 36.8 (30 Mar 2025 21:15)  T(F): 98.2 (31 Mar 2025 00:59), Max: 98.2 (30 Mar 2025 21:15)  HR: 76 (31 Mar 2025 00:59) (74 - 88)  BP: 104/59 (31 Mar 2025 00:59) (93/61 - 105/92)  BP(mean): 76 (31 Mar 2025 00:59) (71 - 76)  RR: 16 (31 Mar 2025 00:59) (15 - 20)  SpO2: 100% (31 Mar 2025 00:59) (99% - 100%)    Parameters below as of 31 Mar 2025 00:59  Patient On (Oxygen Delivery Method): room air    Physical exam:   NAD, resting comfortably in bed   Breathing comfortably on room air   Abdomen soft, non-tender, non-distended   Moderate amount of vaginal bleeding on pad   extremities well perfused     H/H: 9.9/29.3->8.6/25.5 -> 1uPRBC (3/30)     Patient over stable and doing well at this time.   - f/u 2-hour post-transfusion CBC     Dr. Sam updated   Kinza Chopra, PGY1

## 2025-03-31 NOTE — ED ADULT NURSE REASSESSMENT NOTE - NS ED NURSE REASSESS COMMENT FT1
Methotrexate ordered for pt as per OB team, confirmed physician credentialed for methotrexate administration, paper work completed, given to pharmacy, pharmacist will confirm and deliver medication to RN.

## 2025-03-31 NOTE — PROVIDER CONTACT NOTE (OTHER) - BACKGROUND
Patient admitted for cervical ectopic pregnancy. S/P UAE today. Pt received 1U Cryo from 18:40-19:40, and PRBC started at 20:15.

## 2025-03-31 NOTE — PROVIDER CONTACT NOTE (OTHER) - ASSESSMENT
Per patient, legs feel better after  massaged them so now tingling feels worse in arms. Hands and feet feel warm to touch on assessment. Patient able to move arms and legs. Denies any backpain, chest pain, SOB, itching, N/V. VS stable and similar from pre-transfusion vitals.

## 2025-03-31 NOTE — PROGRESS NOTE ADULT - ASSESSMENT
A/P: 33 yo  at 6w0d by LMP 2/15 presenting for heavy vaginal bleeding.  On exam in ED, patient with normal vital signs and labs significant for downtrend in H/H 9.9/29.3->8.6/25.5. TVUS revealed concern for cervical ectopic pregnancy. Physical exam notable for a minimal bleeding from external cervical os. 1uPRBC transfused by ED. Overall, patient clinically stable at this time with improvement in vaginal bleeding. Patient admitted for further monitoring for high suspicion for cervical ectopic. s/p MTX (3/31). Plan for MFM consult this AM.     Neuro: Pain well controlled.   CV: Hemodynamically stable, f/u AM CBC.   Pulm: Saturating well on room air, encourage oob/amb, encourage use of incentive spirometry  GI: NPO  : Voiding spontaneously  Heme: c/w ambulation and SCDs for DVT ppx  FEN: LR@125.   ID: Afebrile  Endo: No active issues   Dispo: follow-up MFM consult     Giorgio Kirk PGY-2

## 2025-03-31 NOTE — CHART NOTE - NSCHARTNOTEFT_GEN_A_CORE
PRE-INTERVENTIONAL RADIOLOGY PROCEDURE NOTE      Patient Age: 34    Patient Gender: female    Procedure: possible bilateral UAE    Diagnosis/Indication: bleeding with cervical ectopic    Interventional Radiology Attending Physician: Dr. Johnson    Ordering Attending Physician: Dr. Mcclain    Pertinent Medical History:  @ 6w2d with vaginal bleeding, TVUS showing cervical ectopic; s/p MTX x1 (3/31)    Pertinent labs:                      8.0    5.79  )-----------( 165      ( 31 Mar 2025 10:06 )             23.1           138  |  108  |  9   ----------------------------<  87  3.4[L]   |  19[L]  |  0.62    Ca    8.1[L]      31 Mar 2025 07:00    TPro  6.2  /  Alb  3.6  /  TBili  0.3  /  DBili  x   /  AST  12  /  ALT  9[L]  /  AlkPhos  43  03-30      PT/INR - ( 31 Mar 2025 07:06 )   PT: 11.9 sec;   INR: 1.04 ratio         PTT - ( 31 Mar 2025 07:06 )  PTT:27.4 sec        Patient and Family Aware ? Yes

## 2025-03-31 NOTE — PROCEDURE NOTE - PROCEDURE FINDINGS AND DETAILS
Right common femoral artery access.   Pelvic and bilateral uterine artery embolization performed without discrete visualization of cervical ectopic. Bilateral uterine artery gelfoam embolization.  Mynx closure device.

## 2025-03-31 NOTE — CHART NOTE - NSCHARTNOTEFT_GEN_A_CORE
At bedside for evening re-evaluation.   1u pRBC running. Patient reporting some initial tingling in arms and legs that improved with  massaging extremities. Denies CP, SOB, palpitations, itching.   Feels dizzy when standing but able to sit up without dizziness.     Vital Signs (24 Hrs):  T(C): 36.7 (03-31-25 @ 19:50), Max: 37.2 (03-31-25 @ 13:18)  HR: 79 (03-31-25 @ 19:50) (71 - 91)  BP: 105/70 (03-31-25 @ 19:50) (90/57 - 123/58)  RR: 18 (03-31-25 @ 19:50) (13 - 24)  SpO2: 99% (03-31-25 @ 19:50) (96% - 100%)  Wt(kg): --      PE:   CV: RRR  Pulm: CTAB, no wheezes  Abdomen: soft, non tender, non distended  : scant bleeding on pad changed 2-3 hours ago     A/P: s/p embolization due to cervical ectopic    - c/w 1u pRBC  - plan for 1u cryo  - repeat labs 4h from transfusions finishing   - continue to monitor vital signs per routine    Kalie Tovar, PGY4

## 2025-03-31 NOTE — PROGRESS NOTE ADULT - ASSESSMENT
P1 @ 6.2wks cervical ectopic, s/p mtx 3/30, s/p 1u pRBC yesterday with decreased but ongoing bleeding.

## 2025-03-31 NOTE — PROGRESS NOTE ADULT - SUBJECTIVE AND OBJECTIVE BOX
R2 GYN Progress Note  HD#2    Patient seen and examined at bedside. Reports vaginal bleeding has improved significantly, has been using same menstrual pad from 230am now 50% saturated. Pain well controlled. Patient has not been OOB. Currently NPO for possible procedure. Patient is voiding spontaneously. Denies CP, SOB, N/V, fevers, and chills.    Vital Signs Last 24 Hours  T(C): 37 (03-31-25 @ 05:18), Max: 37 (03-31-25 @ 05:18)  HR: 83 (03-31-25 @ 05:18) (71 - 88)  BP: 92/59 (03-31-25 @ 05:18) (92/59 - 105/92)  RR: 18 (03-31-25 @ 05:18) (15 - 20)  SpO2: 96% (03-31-25 @ 05:18) (96% - 100%)    I&O's Summary      Physical Exam:  General: NAD  CV: RR  Lungs: CTA b/l  Abdomen: Soft, nontender, nondistended, tympanic, normoactive bowel sounds  : Pad 50% saturated after 4 hours   Ext: No pain or swelling in lower extremities bilaterally     Labs:                        9.0    9.18  )-----------( 190      ( 31 Mar 2025 02:54 )             27.3   baso x      eos x      imm gran x      lymph x      mono x      poly x                            8.6    9.34  )-----------( 215      ( 30 Mar 2025 19:55 )             25.5   baso x      eos x      imm gran x      lymph x      mono x      poly x                            9.9    6.98  )-----------( 219      ( 30 Mar 2025 15:36 )             29.3   baso x      eos x      imm gran 0.3    lymph x      mono x      poly x          MEDICATIONS  (STANDING):  lactated ringers. 1000 milliLiter(s) (125 mL/Hr) IV Continuous <Continuous>    MEDICATIONS  (PRN):

## 2025-03-31 NOTE — PROGRESS NOTE ADULT - NS ATTEND AMEND GEN_ALL_CORE FT
spoke with pt and pt's  in detail.   clinical condition d/w them including VB presentation, sonogram details from yesterday and today, beta hcg from 72625 to 3000, MTX IM x1 yesterday, s/p 1u pRBC yesterday with inappropriate rise in hct, treatment options of mtx, mtx with uae, mtx with cervical balloon/kcl, mtx with uae/suction d&c. all questions answered. all r/b/i/a d/w pt including UAE risk of possible decrease in fertility.   pt and pt's  agree with MTX and UAE at this time.   time spent 30+mins.     POC d/w , Dr.Sternchos gloria gonzalez md

## 2025-04-01 LAB
APTT BLD: 26.7 SEC — SIGNIFICANT CHANGE UP (ref 24.5–35.6)
APTT BLD: 26.9 SEC — SIGNIFICANT CHANGE UP (ref 24.5–35.6)
BASOPHILS # BLD AUTO: 0.03 K/UL — SIGNIFICANT CHANGE UP (ref 0–0.2)
BASOPHILS NFR BLD AUTO: 0.3 % — SIGNIFICANT CHANGE UP (ref 0–2)
EOSINOPHIL # BLD AUTO: 0.2 K/UL — SIGNIFICANT CHANGE UP (ref 0–0.5)
EOSINOPHIL NFR BLD AUTO: 2.2 % — SIGNIFICANT CHANGE UP (ref 0–6)
FIBRINOGEN PPP-MCNC: 275 MG/DL — SIGNIFICANT CHANGE UP (ref 200–445)
FIBRINOGEN PPP-MCNC: 292 MG/DL — SIGNIFICANT CHANGE UP (ref 200–445)
HCG SERPL-ACNC: 2161 MIU/ML — HIGH
HCT VFR BLD CALC: 23.6 % — LOW (ref 34.5–45)
HCT VFR BLD CALC: 24.6 % — LOW (ref 34.5–45)
HCT VFR BLD CALC: 24.6 % — LOW (ref 34.5–45)
HCT VFR BLD CALC: 28.2 % — LOW (ref 34.5–45)
HGB BLD-MCNC: 8.3 G/DL — LOW (ref 11.5–15.5)
HGB BLD-MCNC: 8.4 G/DL — LOW (ref 11.5–15.5)
HGB BLD-MCNC: 8.6 G/DL — LOW (ref 11.5–15.5)
HGB BLD-MCNC: 9.8 G/DL — LOW (ref 11.5–15.5)
IMM GRANULOCYTES NFR BLD AUTO: 0.4 % — SIGNIFICANT CHANGE UP (ref 0–0.9)
INR BLD: 0.92 RATIO — SIGNIFICANT CHANGE UP (ref 0.85–1.16)
INR BLD: 0.98 RATIO — SIGNIFICANT CHANGE UP (ref 0.85–1.16)
LYMPHOCYTES # BLD AUTO: 1.48 K/UL — SIGNIFICANT CHANGE UP (ref 1–3.3)
LYMPHOCYTES # BLD AUTO: 16.2 % — SIGNIFICANT CHANGE UP (ref 13–44)
MCHC RBC-ENTMCNC: 30.9 PG — SIGNIFICANT CHANGE UP (ref 27–34)
MCHC RBC-ENTMCNC: 31.1 PG — SIGNIFICANT CHANGE UP (ref 27–34)
MCHC RBC-ENTMCNC: 31.3 PG — SIGNIFICANT CHANGE UP (ref 27–34)
MCHC RBC-ENTMCNC: 31.4 PG — SIGNIFICANT CHANGE UP (ref 27–34)
MCHC RBC-ENTMCNC: 34.1 G/DL — SIGNIFICANT CHANGE UP (ref 32–36)
MCHC RBC-ENTMCNC: 34.8 G/DL — SIGNIFICANT CHANGE UP (ref 32–36)
MCHC RBC-ENTMCNC: 35 G/DL — SIGNIFICANT CHANGE UP (ref 32–36)
MCHC RBC-ENTMCNC: 35.2 G/DL — SIGNIFICANT CHANGE UP (ref 32–36)
MCV RBC AUTO: 89 FL — SIGNIFICANT CHANGE UP (ref 80–100)
MCV RBC AUTO: 89.1 FL — SIGNIFICANT CHANGE UP (ref 80–100)
MCV RBC AUTO: 89.8 FL — SIGNIFICANT CHANGE UP (ref 80–100)
MCV RBC AUTO: 91.1 FL — SIGNIFICANT CHANGE UP (ref 80–100)
MONOCYTES # BLD AUTO: 0.48 K/UL — SIGNIFICANT CHANGE UP (ref 0–0.9)
MONOCYTES NFR BLD AUTO: 5.3 % — SIGNIFICANT CHANGE UP (ref 2–14)
NEUTROPHILS # BLD AUTO: 6.91 K/UL — SIGNIFICANT CHANGE UP (ref 1.8–7.4)
NEUTROPHILS NFR BLD AUTO: 75.6 % — SIGNIFICANT CHANGE UP (ref 43–77)
NRBC BLD AUTO-RTO: 0 /100 WBCS — SIGNIFICANT CHANGE UP (ref 0–0)
PLATELET # BLD AUTO: 161 K/UL — SIGNIFICANT CHANGE UP (ref 150–400)
PLATELET # BLD AUTO: 169 K/UL — SIGNIFICANT CHANGE UP (ref 150–400)
PLATELET # BLD AUTO: 176 K/UL — SIGNIFICANT CHANGE UP (ref 150–400)
PLATELET # BLD AUTO: 195 K/UL — SIGNIFICANT CHANGE UP (ref 150–400)
PROTHROM AB SERPL-ACNC: 10.6 SEC — SIGNIFICANT CHANGE UP (ref 9.9–13.4)
PROTHROM AB SERPL-ACNC: 11.3 SEC — SIGNIFICANT CHANGE UP (ref 9.9–13.4)
RBC # BLD: 2.65 M/UL — LOW (ref 3.8–5.2)
RBC # BLD: 2.7 M/UL — LOW (ref 3.8–5.2)
RBC # BLD: 2.74 M/UL — LOW (ref 3.8–5.2)
RBC # BLD: 3.17 M/UL — LOW (ref 3.8–5.2)
RBC # FLD: 13.7 % — SIGNIFICANT CHANGE UP (ref 10.3–14.5)
RBC # FLD: 13.8 % — SIGNIFICANT CHANGE UP (ref 10.3–14.5)
RBC # FLD: 13.9 % — SIGNIFICANT CHANGE UP (ref 10.3–14.5)
RBC # FLD: 14 % — SIGNIFICANT CHANGE UP (ref 10.3–14.5)
WBC # BLD: 8.3 K/UL — SIGNIFICANT CHANGE UP (ref 3.8–10.5)
WBC # BLD: 9.14 K/UL — SIGNIFICANT CHANGE UP (ref 3.8–10.5)
WBC # BLD: 9.18 K/UL — SIGNIFICANT CHANGE UP (ref 3.8–10.5)
WBC # BLD: 9.31 K/UL — SIGNIFICANT CHANGE UP (ref 3.8–10.5)
WBC # FLD AUTO: 8.3 K/UL — SIGNIFICANT CHANGE UP (ref 3.8–10.5)
WBC # FLD AUTO: 9.14 K/UL — SIGNIFICANT CHANGE UP (ref 3.8–10.5)
WBC # FLD AUTO: 9.18 K/UL — SIGNIFICANT CHANGE UP (ref 3.8–10.5)
WBC # FLD AUTO: 9.31 K/UL — SIGNIFICANT CHANGE UP (ref 3.8–10.5)

## 2025-04-01 PROCEDURE — 99231 SBSQ HOSP IP/OBS SF/LOW 25: CPT

## 2025-04-01 RX ORDER — POLYETHYLENE GLYCOL 3350 17 G/17G
17 POWDER, FOR SOLUTION ORAL DAILY
Refills: 0 | Status: DISCONTINUED | OUTPATIENT
Start: 2025-04-01 | End: 2025-04-03

## 2025-04-01 RX ORDER — SENNA 187 MG
2 TABLET ORAL AT BEDTIME
Refills: 0 | Status: DISCONTINUED | OUTPATIENT
Start: 2025-04-01 | End: 2025-04-03

## 2025-04-01 RX ORDER — MELATONIN 5 MG
5 TABLET ORAL AT BEDTIME
Refills: 0 | Status: DISCONTINUED | OUTPATIENT
Start: 2025-04-01 | End: 2025-04-03

## 2025-04-01 RX ORDER — ACETAMINOPHEN 500 MG/5ML
975 LIQUID (ML) ORAL EVERY 6 HOURS
Refills: 0 | Status: DISCONTINUED | OUTPATIENT
Start: 2025-04-01 | End: 2025-04-03

## 2025-04-01 RX ADMIN — Medication 975 MILLIGRAM(S): at 11:00

## 2025-04-01 RX ADMIN — Medication 2 TABLET(S): at 21:52

## 2025-04-01 RX ADMIN — POLYETHYLENE GLYCOL 3350 17 GRAM(S): 17 POWDER, FOR SOLUTION ORAL at 12:03

## 2025-04-01 RX ADMIN — Medication 975 MILLIGRAM(S): at 23:00

## 2025-04-01 RX ADMIN — Medication 1000 MILLIGRAM(S): at 05:33

## 2025-04-01 RX ADMIN — Medication 975 MILLIGRAM(S): at 21:51

## 2025-04-01 RX ADMIN — Medication 5 MILLIGRAM(S): at 01:30

## 2025-04-01 RX ADMIN — Medication 975 MILLIGRAM(S): at 10:05

## 2025-04-01 RX ADMIN — Medication 400 MILLIGRAM(S): at 04:02

## 2025-04-01 RX ADMIN — Medication 5 MILLIGRAM(S): at 23:49

## 2025-04-01 NOTE — DISCHARGE NOTE PROVIDER - NSDCMRMEDTOKEN_GEN_ALL_CORE_FT
amoxicillin-clavulanate 875 mg-125 mg oral tablet: 1 tab(s) orally 2 times a day to be completed on 11/11  melatonin 3 mg oral tablet: 1 tab(s) orally once a day (at bedtime) As needed Insomnia   melatonin 3 mg oral tablet: 1 tab(s) orally once a day (at bedtime) As needed Insomnia  Motrin 600 mg oral tablet: 1 tab(s) orally every 6 hours  Tylenol 500 mg oral tablet: 2 tab(s) orally every 6 hours

## 2025-04-01 NOTE — PROGRESS NOTE ADULT - SUBJECTIVE AND OBJECTIVE BOX
EKLSEY VINES Progress Note    POD#1   HD#3    Patient seen and examined at bedside.  No acute events overnight. No acute complaints.  Pain well controlled. Voiding spontaneously. Patient endorses improved vaginal bleeding.   Denies CP, SOB, N/V, fevers, and chills.    Vital Signs Last 24 Hours  T(C): 37.2 (04-01-25 @ 04:23), Max: 37.2 (03-31-25 @ 13:18)  HR: 82 (04-01-25 @ 04:23) (76 - 91)  BP: 119/71 (04-01-25 @ 04:23) (90/57 - 123/58)  RR: 18 (04-01-25 @ 04:23) (13 - 24)  SpO2: 97% (04-01-25 @ 04:23) (96% - 100%)    I&O's Summary    30 Mar 2025 07:01  -  31 Mar 2025 07:00  --------------------------------------------------------  IN: 125 mL / OUT: 0 mL / NET: 125 mL    31 Mar 2025 07:01  -  01 Apr 2025 06:30  --------------------------------------------------------  IN: 500 mL / OUT: 0 mL / NET: 500 mL        Physical Exam:  General: NAD  CV: RR  Lungs: CTA b/l  Abdomen: Soft, nontender, nondistended, tympanic, normoactive bowel sounds  : Pad with minimal bleeding   Ext: No pain or swelling in lower extremities bilaterally     Labs:                        9.8    8.30  )-----------( 195      ( 01 Apr 2025 04:30 )             28.2   baso x      eos x      imm gran x      lymph x      mono x      poly x                            8.0    5.79  )-----------( 165      ( 31 Mar 2025 10:06 )             23.1   baso x      eos x      imm gran x      lymph x      mono x      poly x                            8.2    6.86  )-----------( 157      ( 31 Mar 2025 07:05 )             23.5   baso x      eos x      imm gran x      lymph x      mono x      poly x                            9.0    9.18  )-----------( 190      ( 31 Mar 2025 02:54 )             27.3   baso x      eos x      imm gran x      lymph x      mono x      poly x                            8.6    9.34  )-----------( 215      ( 30 Mar 2025 19:55 )             25.5   baso x      eos x      imm gran x      lymph x      mono x      poly x                            9.9    6.98  )-----------( 219      ( 30 Mar 2025 15:36 )             29.3   baso x      eos x      imm gran 0.3    lymph x      mono x      poly x          MEDICATIONS  (STANDING):  acetaminophen   IVPB .. 1000 milliGRAM(s) IV Intermittent every 6 hours  famotidine    Tablet 20 milliGRAM(s) Oral daily  lactated ringers. 1000 milliLiter(s) (125 mL/Hr) IV Continuous <Continuous>  melatonin 5 milliGRAM(s) Oral at bedtime    MEDICATIONS  (PRN):  HYDROmorphone  Injectable 0.5 milliGRAM(s) IV Push every 4 hours PRN Pain  simethicone 80 milliGRAM(s) Chew every 8 hours PRN Gas     KELSEY VINES Progress Note    POD#1   HD#3    Patient seen and examined at bedside.  No acute events overnight. No acute complaints.  Pain well controlled. Voiding spontaneously. Patient endorses improved vaginal bleeding and states she has changed her pad twice in the past 12 hours.   Denies CP, SOB, N/V, fevers, and chills.    Vital Signs Last 24 Hours  T(C): 37.2 (04-01-25 @ 04:23), Max: 37.2 (03-31-25 @ 13:18)  HR: 82 (04-01-25 @ 04:23) (76 - 91)  BP: 119/71 (04-01-25 @ 04:23) (90/57 - 123/58)  RR: 18 (04-01-25 @ 04:23) (13 - 24)  SpO2: 97% (04-01-25 @ 04:23) (96% - 100%)    I&O's Summary    30 Mar 2025 07:01  -  31 Mar 2025 07:00  --------------------------------------------------------  IN: 125 mL / OUT: 0 mL / NET: 125 mL    31 Mar 2025 07:01  -  01 Apr 2025 06:30  --------------------------------------------------------  IN: 500 mL / OUT: 0 mL / NET: 500 mL        Physical Exam:  General: NAD  CV: well perfused  Lungs: nonlabroed respirations  Abdomen: Soft, nontender, nondistended, tympanic, normoactive bowel sounds  : Pad in place for ~4.5 hours 10% saturated   Ext: No pain or swelling in lower extremities bilaterally     Labs:                        9.8    8.30  )-----------( 195      ( 01 Apr 2025 04:30 )             28.2   baso x      eos x      imm gran x      lymph x      mono x      poly x                            8.0    5.79  )-----------( 165      ( 31 Mar 2025 10:06 )             23.1   baso x      eos x      imm gran x      lymph x      mono x      poly x                            8.2    6.86  )-----------( 157      ( 31 Mar 2025 07:05 )             23.5   baso x      eos x      imm gran x      lymph x      mono x      poly x                            9.0    9.18  )-----------( 190      ( 31 Mar 2025 02:54 )             27.3   baso x      eos x      imm gran x      lymph x      mono x      poly x                            8.6    9.34  )-----------( 215      ( 30 Mar 2025 19:55 )             25.5   baso x      eos x      imm gran x      lymph x      mono x      poly x                            9.9    6.98  )-----------( 219      ( 30 Mar 2025 15:36 )             29.3   baso x      eos x      imm gran 0.3    lymph x      mono x      poly x          MEDICATIONS  (STANDING):  acetaminophen   IVPB .. 1000 milliGRAM(s) IV Intermittent every 6 hours  famotidine    Tablet 20 milliGRAM(s) Oral daily  lactated ringers. 1000 milliLiter(s) (125 mL/Hr) IV Continuous <Continuous>  melatonin 5 milliGRAM(s) Oral at bedtime    MEDICATIONS  (PRN):  HYDROmorphone  Injectable 0.5 milliGRAM(s) IV Push every 4 hours PRN Pain  simethicone 80 milliGRAM(s) Chew every 8 hours PRN Gas

## 2025-04-01 NOTE — DISCHARGE NOTE PROVIDER - PROVIDER TOKENS
PROVIDER:[TOKEN:[[id number]],FOLLOWUP:[2 weeks]] PROVIDER:[TOKEN:[[id number]],SCHEDULEDAPPT:[04/07/2025]]

## 2025-04-01 NOTE — DISCHARGE NOTE PROVIDER - CARE PROVIDER_API CALL
Maria Isabel Sam  Obstetrics and Gynecology  [address] [place] [id number]  Phone: [phone]  Fax: [phone]  Follow Up Time: 2 weeks   Maria Isabel Sam  Obstetrics and Gynecology  [address] [place] [id number]  Phone: [phone]  Fax: [phone]  Scheduled Appointment: 04/07/2025

## 2025-04-01 NOTE — CHART NOTE - NSCHARTNOTESELECT_GEN_ALL_CORE
MFM Fellow On Call
PM Evaluation
Post-transfusion re-evaluation
IR pre-procedure note
R1 - Revaluation

## 2025-04-01 NOTE — DISCHARGE NOTE PROVIDER - NSDCFUADDINST_GEN_ALL_CORE_FT
Postoperative Instructions    Pain control  For pain control, take the followin. Motrin 600mg four times a day, take with food.  2. Add Tylenol 975 four times a day, alternated with motrin.    Motrin and Tylenol can be obtained over the counter.    Postoperative restrictions  Do not drive or make important decisions for 24 hours after anesthesia. Nothing in the vagina (tampons, sexual intercourse), no tub baths, pools or hot tubs for 2 weeks (showers are ok!). No lifting anything heavier than 15 lbs, no strenuous exercise for 2 weeks after surgery.        Vaginal bleeding  Spotting and intermittent passage of blood clots per vagina is normal in first few weeks after surgery.  If you are soaking 1 pad per hour, that is NOT normal and you should notify Dr. GARRIDO office and seek medical attention right away.     Signs of Infection  Call the office and/or come to the emergency room for any of the following: foul smelling vaginal discharge, fever over 100.4F, abdominal pain or cramping that does not get better with over the counter medications, nausea/vomiting (especially if you become unable to tolerate oral intake), inability to urinate. Any of these could be signs that you may be developing an infection requiring antibiotics.    Follow Up  Call Dr. Sam's office to schedule a postoperative appointment in 2 weeks.  Postoperative Instructions    Pain control  For pain control, take the followin. Motrin 600mg four times a day, take with food.  2. Add Tylenol 975 four times a day, alternated with motrin.    Motrin and Tylenol can be obtained over the counter.    Postoperative restrictions  Do not drive or make important decisions for 24 hours after anesthesia. Nothing in the vagina (tampons, sexual intercourse), no tub baths, pools or hot tubs for 2 weeks (showers are ok!). No lifting anything heavier than 15 lbs, no strenuous exercise for 2 weeks after surgery.        Vaginal bleeding  Spotting and intermittent passage of blood clots per vagina is normal in first few weeks after surgery.  If you are soaking 1 pad per hour, that is NOT normal and you should notify Dr. GARRIDO office and seek medical attention right away.     Signs of Infection  Call the office and/or come to the emergency room for any of the following: foul smelling vaginal discharge, fever over 100.4F, abdominal pain or cramping that does not get better with over the counter medications, nausea/vomiting (especially if you become unable to tolerate oral intake), inability to urinate. Any of these could be signs that you may be developing an infection requiring antibiotics.    Follow Up  Call Dr. Sam's office to schedule an appointment on 25

## 2025-04-01 NOTE — DISCHARGE NOTE PROVIDER - HOSPITAL COURSE
35 yo  at 6w0d by LMP 2/15 presented on 3/30 for heavy vaginal bleeding.  On exam in ED, patient with normal vital signs and labs significant for downtrend in H/H 9.9/29.3->8.6/25.5. TVUS revealed concern for cervical ectopic pregnancy. Due to high suspicion for cervical ectopic pregnancy, patient was admitted for further monitoring and MFM evaluation.    On HD#1, patient was given IM MTX for treatment of cervical ectopic pregnancy. She received 1u pRBC for downtrend in H/H in the setting of ongoing vaginal bleeding. IR was consulted with plan for UAE in AM.     On HD#2, patient underwent bilateral UAE for management of cervical ectopic pregnancy. Patient underwent uncomplicated procedure. Patient received additional 1u pRBC and 1u cryo.     On HD#2/POD#1, patient with significant improvement in vaginal bleeding. Patient was transitioned to oral analgesics and pain was well controlled. Upon discharge on POD#1, the patient is ambulating, voiding spontaneously, tolerating oral intake, pain was well controlled with oral medication, and vital signs were stable. Patient to have close follow up with Dr. Sam. 33 yo  at 6w0d by LMP 2/15 presented on 3/30 for heavy vaginal bleeding.  On exam in ED, patient with normal vital signs and labs significant for downtrend in H/H 9.9/29.3->8.6/25.5. TVUS revealed concern for cervical ectopic pregnancy. Due to high suspicion for cervical ectopic pregnancy, patient was admitted for further monitoring and MFM evaluation.    On HD#1, patient was given IM MTX for treatment of cervical ectopic pregnancy. She received 1u pRBC for downtrend in H/H in the setting of ongoing vaginal bleeding. IR was consulted with plan for UAE in AM.     On HD#2, patient underwent bilateral UAE for management of cervical ectopic pregnancy. Patient underwent uncomplicated procedure. Patient received additional 1u pRBC and 1u cryo.     On HD#3, normal inpatient care. No notable events. Vaginal bleeding improved. Patient was transitioned to oral analgesics and pain was well controlled.     On HD#4/POD#2, patient with significant improvement in vaginal bleeding. Upon discharge on POD#***, the patient is ambulating, voiding spontaneously, tolerating oral intake, pain was well controlled with oral medication, and vital signs were stable. Patient to have close follow up with Dr. Sam. 33 yo  at 6w0d by LMP 15 presented on 3/30 for heavy vaginal bleeding.  On exam in ED, patient with normal vital signs and labs significant for downtrend in H/H 9.9/29.3->8.6/25.5. TVUS revealed concern for cervical ectopic pregnancy. Due to high suspicion for cervical ectopic pregnancy, patient was admitted for further monitoring and MFM evaluation.    On HD#1, patient was given IM MTX for treatment of cervical ectopic pregnancy. She received 1u pRBC for downtrend in H/H in the setting of ongoing vaginal bleeding. IR was consulted with plan for UAE in AM.     On HD#2, patient underwent bilateral UAE for management of cervical ectopic pregnancy. Patient underwent uncomplicated procedure. Patient received additional 1u pRBC and 1u cryo.     On HD#3, normal inpatient care. No notable events. Vaginal bleeding improved. Patient was transitioned to oral analgesics and pain was well controlled.     On HD#4/POD#2, patient with significant improvement in vaginal bleeding. Patient received normal inpatient care.    Upon discharge on POD#3/HD#5, the patient is ambulating, voiding spontaneously, tolerating oral intake, pain was well controlled with oral medication, and vital signs were stable. Patient to have close follow up with Dr. Sam. Patient to go to [facility] for repeat bHCG on  (script provided before d/c) and to f/u in OBGYN office on .

## 2025-04-01 NOTE — PROGRESS NOTE ADULT - ASSESSMENT
A/P: 35 yo  at 6w0d by LMP 2/15 presenting for heavy vaginal bleeding.  On exam in ED, patient with normal vital signs and labs significant for downtrend in H/H 9.9/29.3->8.6/25.5. TVUS revealed concern for cervical ectopic pregnancy. Physical exam notable for a minimal bleeding from external cervical os. 1uPRBC transfused by ED. Patient admitted for further monitoring for high suspicion for cervical ectopic now s/p MTX (3/31) and bilateral UAE (3/31).  Patient progressing well post-operatively and is hemodynamically stable.     Neuro: Pain well controlled. c/w Tylenol, Dilaudid PRN  CV: Hemodynamically stable  - Hct: 29.3->25.5->1u pRBC->27.3->23.1->1u pRBC->28.2   Pulm: Saturating well on room air, encourage oob/amb, encourage use of incentive spirometry  GI: NPO  : Voiding spontaneously  - monitor pad counts  - IR: s/p bilateral UAE (3/31)  - s/p MTX (3/30)  - f/u Day 4 bHCG  Heme: c/w ambulation and SCDs for DVT ppx  - s/p 1u cryo   FEN: LR@125.   ID: Afebrile  Endo: No active issues   Dispo: continue routine post-op care    Halely Bonner PGY2

## 2025-04-01 NOTE — PROGRESS NOTE ADULT - ASSESSMENT
Interventional Radiology Follow-Up Note    This is a 34y Female s/p UAE on 3/31 in Interventional Radiology with Dr. Fraga.     S: Patient seen and examined @ bedside. No complaints offered.     Medication:       Vitals:   T(F): 98.2, Max: 99 (13:18)  HR: 96  BP: 104/74  RR: 18  SpO2: 96%    Physical Exam:  General: Nontoxic, in NAD, A&O x3.  Abdomen: soft, NTND, no peritoneal signs.  Extremities: right groin clean, dry and intact, soft with no evidence of hematoma, bilateral femoral/dp/pt pulses +2. No pedal edema or calf tenderness noted.    LABS:  WBC 8.30 / Hgb 9.8 / Hct 28.2 / Plt 195  Na -- / K -- / CO2 -- / Cl -- / BUN -- / Cr -- / Glucose --  ALT -- / AST -- / Alk Phos -- / Tbili --  Ptt 26.9 / Pt 11.3 / INR 0.98      Assessment/Plan: 33 yo  at 6w0d by LMP 2/15 presenting for heavy vaginal bleeding.  On exam in ED, patient with normal vital signs and labs significant for downtrend in H/H 9.9/29.3->8.6/25.5. TVUS revealed concern for cervical ectopic pregnancy. Physical exam notable for a minimal bleeding from external cervical os. 1uPRBC transfused by ED. Patient admitted for further monitoring for high suspicion for cervical ectopic now s/p MTX (3/31) and bilateral UAE (3/31).  Patient progressing well post-operatively and is hemodynamically stable.     -continue global management per primary team  -monitor h/h; transfuse as needed  -trend vs/labs  -IR signing off; no IR follow up neccessary     Any questions or concerns regarding above please reach out to IR:   -Available on [employer] teams  -During working hours (7a-5p): call [phone]  -Emergent issues after 5pm: page: [phone]  -Non-emergent consults: Please place a [place] order "IR Consult" with an appropriate callback number  -Scheduling questions: [phone]  -Clinic/Outpatient [phone]      ADOLFO Lundy- BC  Available on teams           Interventional Radiology Follow-Up Note    This is a 34y Female s/p UAE on 3/31 in Interventional Radiology with Dr. Fraga.     S: Patient seen and examined @ bedside with . No complaints offered. Resting comfortably.     Medication:       Vitals:   T(F): 98.2, Max: 99 (13:18)  HR: 96  BP: 104/74  RR: 18  SpO2: 96%    Physical Exam:  General: Nontoxic, in NAD, A&O x3.  Abdomen: soft, NTND, no peritoneal signs.  Extremities: right groin dressing removed; bruising noted at site; soft to palpation; bilateral femoral/dp/pt pulses +2. No pedal edema or calf tenderness noted.    LABS:  WBC 8.30 / Hgb 9.8 / Hct 28.2 / Plt 195  Na -- / K -- / CO2 -- / Cl -- / BUN -- / Cr -- / Glucose --  ALT -- / AST -- / Alk Phos -- / Tbili --  Ptt 26.9 / Pt 11.3 / INR 0.98      Assessment/Plan: 35 yo  at 6w0d by LMP 2/15 presenting for heavy vaginal bleeding.  On exam in ED, patient with normal vital signs and labs significant for downtrend in H/H 9.9/29.3->8.6/25.5. TVUS revealed concern for cervical ectopic pregnancy. Physical exam notable for a minimal bleeding from external cervical os. 1uPRBC transfused by ED. Patient admitted for further monitoring for high suspicion for cervical ectopic now s/p MTX (3/31) and bilateral UAE (3/31).  Patient progressing well post-operatively and is hemodynamically stable.     -continue global management per primary team  -monitor h/h; transfuse as needed  -trend vs/labs  -IR signing off; no IR follow up necessary     Any questions or concerns regarding above please reach out to IR:   -Available on [employer] teams  -During working hours (7a-5p): call [phone]  -Emergent issues after 5pm: page: [phone]  -Non-emergent consults: Please place a [place] order "IR Consult" with an appropriate callback number  -Scheduling questions: [phone]  -Clinic/Outpatient [phone]      MANASA LundyCNP- BC  Available on teams

## 2025-04-01 NOTE — CHART NOTE - NSCHARTNOTEFT_GEN_A_CORE
Patient seen and evaluated to assess for vaginal bleeding. Patient denying SOB, palpitations, dizziness, lightheadedness. As per patient, she changed her pad 3 hours ago.     Pad completely clear of vaginal bleeding. No spotting visualized.     dw Dr. Elsy Danielle, PGY-1 Patient seen and evaluated to assess for vaginal bleeding. Patient denying SOB, palpitations, dizziness, lightheadedness. As per patient, she changed her pad 3 hours ago.     Pad completely clear of vaginal bleeding. No spotting visualized.     Dr. Chin made aware  Tara Danielle, PGY-1

## 2025-04-01 NOTE — DISCHARGE NOTE PROVIDER - REASON FOR ADMISSION
Vaginal bleeding in setting of possible cervical ectopic pregnancy Vagina bleeding in setting of possible cervical ectopic pregnancy

## 2025-04-01 NOTE — PROGRESS NOTE ADULT - REASON FOR ADMISSION
Vagina bleeding in setting of possible cervical ectopic pregnancy Vaginal bleeding in setting of possible cervical ectopic pregnancy

## 2025-04-02 LAB
BLD GP AB SCN SERPL QL: NEGATIVE — SIGNIFICANT CHANGE UP
HCT VFR BLD CALC: 23.2 % — LOW (ref 34.5–45)
HGB BLD-MCNC: 8.1 G/DL — LOW (ref 11.5–15.5)
MCHC RBC-ENTMCNC: 31.6 PG — SIGNIFICANT CHANGE UP (ref 27–34)
MCHC RBC-ENTMCNC: 34.9 G/DL — SIGNIFICANT CHANGE UP (ref 32–36)
MCV RBC AUTO: 90.6 FL — SIGNIFICANT CHANGE UP (ref 80–100)
NRBC BLD AUTO-RTO: 0 /100 WBCS — SIGNIFICANT CHANGE UP (ref 0–0)
PLATELET # BLD AUTO: 177 K/UL — SIGNIFICANT CHANGE UP (ref 150–400)
RBC # BLD: 2.56 M/UL — LOW (ref 3.8–5.2)
RBC # FLD: 13.7 % — SIGNIFICANT CHANGE UP (ref 10.3–14.5)
RH IG SCN BLD-IMP: POSITIVE — SIGNIFICANT CHANGE UP
WBC # BLD: 9.75 K/UL — SIGNIFICANT CHANGE UP (ref 3.8–10.5)
WBC # FLD AUTO: 9.75 K/UL — SIGNIFICANT CHANGE UP (ref 3.8–10.5)

## 2025-04-02 RX ORDER — IBUPROFEN 200 MG
600 TABLET ORAL EVERY 6 HOURS
Refills: 0 | Status: DISCONTINUED | OUTPATIENT
Start: 2025-04-02 | End: 2025-04-03

## 2025-04-02 RX ORDER — ONDANSETRON HCL/PF 4 MG/2 ML
4 VIAL (ML) INJECTION EVERY 8 HOURS
Refills: 0 | Status: DISCONTINUED | OUTPATIENT
Start: 2025-04-02 | End: 2025-04-02

## 2025-04-02 RX ORDER — ACETAMINOPHEN 500 MG/5ML
1000 LIQUID (ML) ORAL ONCE
Refills: 0 | Status: COMPLETED | OUTPATIENT
Start: 2025-04-02 | End: 2025-04-02

## 2025-04-02 RX ORDER — ONDANSETRON HCL/PF 4 MG/2 ML
4 VIAL (ML) INJECTION ONCE
Refills: 0 | Status: DISCONTINUED | OUTPATIENT
Start: 2025-04-02 | End: 2025-04-02

## 2025-04-02 RX ORDER — OXYCODONE HYDROCHLORIDE 30 MG/1
5 TABLET ORAL ONCE
Refills: 0 | Status: DISCONTINUED | OUTPATIENT
Start: 2025-04-02 | End: 2025-04-02

## 2025-04-02 RX ADMIN — POLYETHYLENE GLYCOL 3350 17 GRAM(S): 17 POWDER, FOR SOLUTION ORAL at 11:13

## 2025-04-02 RX ADMIN — Medication 975 MILLIGRAM(S): at 18:30

## 2025-04-02 RX ADMIN — Medication 975 MILLIGRAM(S): at 06:24

## 2025-04-02 RX ADMIN — Medication 20 MILLIGRAM(S): at 08:06

## 2025-04-02 RX ADMIN — Medication 975 MILLIGRAM(S): at 05:12

## 2025-04-02 RX ADMIN — Medication 600 MILLIGRAM(S): at 09:07

## 2025-04-02 RX ADMIN — Medication 400 MILLIGRAM(S): at 11:27

## 2025-04-02 RX ADMIN — Medication 975 MILLIGRAM(S): at 23:00

## 2025-04-02 RX ADMIN — Medication 600 MILLIGRAM(S): at 21:30

## 2025-04-02 RX ADMIN — Medication 975 MILLIGRAM(S): at 17:29

## 2025-04-02 RX ADMIN — Medication 2 TABLET(S): at 22:17

## 2025-04-02 RX ADMIN — Medication 600 MILLIGRAM(S): at 20:48

## 2025-04-02 RX ADMIN — Medication 80 MILLIGRAM(S): at 22:16

## 2025-04-02 RX ADMIN — Medication 1000 MILLIGRAM(S): at 12:10

## 2025-04-02 RX ADMIN — Medication 600 MILLIGRAM(S): at 08:22

## 2025-04-02 NOTE — PROGRESS NOTE ADULT - ASSESSMENT
A/P: 33 yo  at 6w0d by LMP 2/15 presenting for heavy vaginal bleeding.  On exam in ED, patient with normal vital signs and labs significant for downtrend in H/H 9.9/29.3->8.6/25.5. TVUS revealed concern for cervical ectopic pregnancy. Physical exam notable for a minimal bleeding from external cervical os. 1uPRBC transfused by ED and an additional unit on (3/31). Patient admitted for further monitoring for high suspicion for cervical ectopic now s/p MTX (3/31) and bilateral UAE (3/31).  Patient progressing well post-operatively and is hemodynamically stable.     Neuro: Some breakthrough pain this am s/p oxy 5mg, will continue with Tylenol and start Motrin for pain control.   CV: Hemodynamically stable  -  Hct: 29.3->25.5->1upRBC (3/30)->27.3->>23.1->1upRBC->28.2->24.6->23.6->24.6 f/u AM cbc pt denies sxs of anemia.   Pulm: Saturating well on room air, encourage oob/amb, encourage use of incentive spirometry  GI: Reg diet   : Voiding spontaneously  - monitor pad counts  - IR: s/p bilateral UAE (3/31)  - s/p MTX (3/30)  - bHCG: 10,192(3/30)->MTX(3/31)->3947(3/31)->2161()    - f/u Day 4 bHCG  Heme: c/w ambulation and SCDs for DVT ppx  - s/p 1u cryo   FEN: SLIV  ID: Afebrile  Endo: No active issues   Dispo: continue routine post-op care, f/u AM cbc, monitor ecchymosis on right thigh for changes.     DTaveras PGY3

## 2025-04-02 NOTE — PROGRESS NOTE ADULT - ASSESSMENT
Interventional Radiology Follow-Up Note    34y Female with persistent vaginal bleeding from cervical ectopic s/p UAE on 3/31 in Interventional Radiology with Dr. Fraga.     S: Patient seen and examined @ bedside. Continues with left sided pain.    Medication:       Vitals:   T(F): 98.3, Max: 99 (23:53)  HR: 73  BP: 104/65  RR: 18  SpO2: 98%    Physical Exam:  General: Nontoxic, in NAD, A&O x3.  Abdomen: soft, NTND, no peritoneal signs.  Extremities:  Right groin clean, dry and intact, soft. Unchanged size of bruising.      LABS:  WBC 9.75 / Hgb 8.1 / Hct 23.2 / Plt 177    Assessment/Plan:  34y Female with persistent vaginal bleeding from cervical ectopic s/p UAE on 3/31 in Interventional Radiology with Dr. Fraga.     -continue global management per primary team  -monitor h/h; transfuse as needed  -trend vs/labs

## 2025-04-02 NOTE — PROGRESS NOTE ADULT - SUBJECTIVE AND OBJECTIVE BOX
Subjective:   Pt seen and examined at bedside. Some moderate left sided abdominal pain this AM, which improved with PO Oxy.  Pt denies fever, chills, chest pain, SOB, nausea, vomiting, lightheadedness, dizziness.      Objective:    MEDICATIONS  (STANDING):  famotidine    Tablet 20 milliGRAM(s) Oral daily  melatonin 5 milliGRAM(s) Oral at bedtime  oxyCODONE    IR 5 milliGRAM(s) Oral once  polyethylene glycol 3350 17 Gram(s) Oral daily  senna 2 Tablet(s) Oral at bedtime    MEDICATIONS  (PRN):  acetaminophen     Tablet .. 975 milliGRAM(s) Oral every 6 hours PRN Moderate Pain (4 - 6)  simethicone 80 milliGRAM(s) Chew every 8 hours PRN Gas      T(F): 98.3 (04-02-25 @ 05:19), Max: 99 (04-01-25 @ 23:53)  HR: 73 (04-02-25 @ 05:19) (71 - 96)  BP: 104/65 (04-02-25 @ 05:19) (95/59 - 109/74)  RR: 18 (04-02-25 @ 05:19) (16 - 18)  SpO2: 98% (04-02-25 @ 05:19) (96% - 100%)  Wt(kg): --    Physical Exam:  Constitutional: NAD, A+O x3  Lungs: breathing comfortably on RA  Abdomen: Soft, nondistended, no guarding or rebound tenderness. 3x3cm RLQ ecchymosis stable from yesterday   : Minimal bleeding on pad  Extremities: No lower extremity edema or calf tenderness bilaterally; 10x4 cm R upper anterior thigh ecchymosis, slightly larger than yesterday.     LABS:            PT/INR - ( 01 Apr 2025 20:19 )   PT: 10.6 sec;   INR: 0.92 ratio         PTT - ( 01 Apr 2025 20:19 )  PTT:26.7 sec  Urinalysis Basic - ( 31 Mar 2025 07:00 )    Color: x / Appearance: x / SG: x / pH: x  Gluc: 87 mg/dL / Ketone: x  / Bili: x / Urobili: x   Blood: x / Protein: x / Nitrite: x   Leuk Esterase: x / RBC: x / WBC x   Sq Epi: x / Non Sq Epi: x / Bacteria: x      CAPILLARY BLOOD GLUCOSE          I&O's Summary    31 Mar 2025 07:01  -  01 Apr 2025 07:00  --------------------------------------------------------  IN: 500 mL / OUT: 0 mL / NET: 500 mL    01 Apr 2025 07:01  -  02 Apr 2025 06:41  --------------------------------------------------------  IN: 0 mL / OUT: 200 mL / NET: -200 mL

## 2025-04-02 NOTE — PROGRESS NOTE ADULT - ATTENDING COMMENTS
Patient a/w likely cervical ectopic, s/p MTX and UAE (3/31). Seen and evaluated at bedside.   Doing well meeting all appropriate milestones at this time  Overall stable abd exam, some pink staining on pad.   Pt reports staining/bleeding remains minimal today however seems slightly more than yesterday   VSS, ambulating without difficulty.   Encourage OOB.   Dw patient could consider discharge home today vs possibly tomorrow   As staining is slightly increased today vs yesterday will keep overnight for monitoring and pad counts.   Will repeat CBC, day 4 bHCG in AM     Maria Isabel Sam MD
HD#3  cervical ectopic s/p MTX, UAE  H/H with appropriate rise after 1uPRBC yesterday  fibrinogen increase after cryo yesterday  pt denies abd pain, small amount of blood on pad  plan for repeat labs today, monitor sympt, VS    MD Luis

## 2025-04-03 ENCOUNTER — TRANSCRIPTION ENCOUNTER (OUTPATIENT)
Age: 35
End: 2025-04-03

## 2025-04-03 VITALS
RESPIRATION RATE: 18 BRPM | TEMPERATURE: 98 F | SYSTOLIC BLOOD PRESSURE: 102 MMHG | OXYGEN SATURATION: 98 % | HEART RATE: 71 BPM | DIASTOLIC BLOOD PRESSURE: 67 MMHG

## 2025-04-03 LAB
BASOPHILS # BLD AUTO: 0.04 K/UL — SIGNIFICANT CHANGE UP (ref 0–0.2)
BASOPHILS NFR BLD AUTO: 0.6 % — SIGNIFICANT CHANGE UP (ref 0–2)
EOSINOPHIL # BLD AUTO: 0.46 K/UL — SIGNIFICANT CHANGE UP (ref 0–0.5)
EOSINOPHIL NFR BLD AUTO: 6.8 % — HIGH (ref 0–6)
HCG SERPL-ACNC: 711 MIU/ML — HIGH
HCT VFR BLD CALC: 22.6 % — LOW (ref 34.5–45)
HGB BLD-MCNC: 7.7 G/DL — LOW (ref 11.5–15.5)
IMM GRANULOCYTES NFR BLD AUTO: 0.3 % — SIGNIFICANT CHANGE UP (ref 0–0.9)
LYMPHOCYTES # BLD AUTO: 1.94 K/UL — SIGNIFICANT CHANGE UP (ref 1–3.3)
LYMPHOCYTES # BLD AUTO: 28.5 % — SIGNIFICANT CHANGE UP (ref 13–44)
MCHC RBC-ENTMCNC: 30.9 PG — SIGNIFICANT CHANGE UP (ref 27–34)
MCHC RBC-ENTMCNC: 34.1 G/DL — SIGNIFICANT CHANGE UP (ref 32–36)
MCV RBC AUTO: 90.8 FL — SIGNIFICANT CHANGE UP (ref 80–100)
MONOCYTES # BLD AUTO: 0.21 K/UL — SIGNIFICANT CHANGE UP (ref 0–0.9)
MONOCYTES NFR BLD AUTO: 3.1 % — SIGNIFICANT CHANGE UP (ref 2–14)
NEUTROPHILS # BLD AUTO: 4.14 K/UL — SIGNIFICANT CHANGE UP (ref 1.8–7.4)
NEUTROPHILS NFR BLD AUTO: 60.7 % — SIGNIFICANT CHANGE UP (ref 43–77)
NRBC BLD AUTO-RTO: 0 /100 WBCS — SIGNIFICANT CHANGE UP (ref 0–0)
PLATELET # BLD AUTO: 186 K/UL — SIGNIFICANT CHANGE UP (ref 150–400)
RBC # BLD: 2.49 M/UL — LOW (ref 3.8–5.2)
RBC # FLD: 13.8 % — SIGNIFICANT CHANGE UP (ref 10.3–14.5)
WBC # BLD: 6.81 K/UL — SIGNIFICANT CHANGE UP (ref 3.8–10.5)
WBC # FLD AUTO: 6.81 K/UL — SIGNIFICANT CHANGE UP (ref 3.8–10.5)

## 2025-04-03 PROCEDURE — P9016: CPT

## 2025-04-03 PROCEDURE — 87086 URINE CULTURE/COLONY COUNT: CPT

## 2025-04-03 PROCEDURE — 86850 RBC ANTIBODY SCREEN: CPT

## 2025-04-03 PROCEDURE — 85014 HEMATOCRIT: CPT

## 2025-04-03 PROCEDURE — C1894: CPT

## 2025-04-03 PROCEDURE — 76817 TRANSVAGINAL US OBSTETRIC: CPT

## 2025-04-03 PROCEDURE — 36430 TRANSFUSION BLD/BLD COMPNT: CPT

## 2025-04-03 PROCEDURE — 84702 CHORIONIC GONADOTROPIN TEST: CPT

## 2025-04-03 PROCEDURE — C1760: CPT

## 2025-04-03 PROCEDURE — 85025 COMPLETE CBC W/AUTO DIFF WBC: CPT

## 2025-04-03 PROCEDURE — 76770 US EXAM ABDO BACK WALL COMP: CPT

## 2025-04-03 PROCEDURE — 87637 SARSCOV2&INF A&B&RSV AMP PRB: CPT

## 2025-04-03 PROCEDURE — 85730 THROMBOPLASTIN TIME PARTIAL: CPT

## 2025-04-03 PROCEDURE — 85384 FIBRINOGEN ACTIVITY: CPT

## 2025-04-03 PROCEDURE — 86965 POOLING BLOOD PLATELETS: CPT

## 2025-04-03 PROCEDURE — 82330 ASSAY OF CALCIUM: CPT

## 2025-04-03 PROCEDURE — 99285 EMERGENCY DEPT VISIT HI MDM: CPT

## 2025-04-03 PROCEDURE — 85610 PROTHROMBIN TIME: CPT

## 2025-04-03 PROCEDURE — 80048 BASIC METABOLIC PNL TOTAL CA: CPT

## 2025-04-03 PROCEDURE — 81001 URINALYSIS AUTO W/SCOPE: CPT

## 2025-04-03 PROCEDURE — 36415 COLL VENOUS BLD VENIPUNCTURE: CPT

## 2025-04-03 PROCEDURE — 83690 ASSAY OF LIPASE: CPT

## 2025-04-03 PROCEDURE — C1887: CPT

## 2025-04-03 PROCEDURE — 84132 ASSAY OF SERUM POTASSIUM: CPT

## 2025-04-03 PROCEDURE — 37244 VASC EMBOLIZE/OCCLUDE BLEED: CPT

## 2025-04-03 PROCEDURE — 76937 US GUIDE VASCULAR ACCESS: CPT

## 2025-04-03 PROCEDURE — 88305 TISSUE EXAM BY PATHOLOGIST: CPT

## 2025-04-03 PROCEDURE — 82803 BLOOD GASES ANY COMBINATION: CPT

## 2025-04-03 PROCEDURE — C1769: CPT

## 2025-04-03 PROCEDURE — 83605 ASSAY OF LACTIC ACID: CPT

## 2025-04-03 PROCEDURE — 85027 COMPLETE CBC AUTOMATED: CPT

## 2025-04-03 PROCEDURE — 82435 ASSAY OF BLOOD CHLORIDE: CPT

## 2025-04-03 PROCEDURE — 86923 COMPATIBILITY TEST ELECTRIC: CPT

## 2025-04-03 PROCEDURE — 86900 BLOOD TYPING SEROLOGIC ABO: CPT

## 2025-04-03 PROCEDURE — P9012: CPT

## 2025-04-03 PROCEDURE — 86901 BLOOD TYPING SEROLOGIC RH(D): CPT

## 2025-04-03 PROCEDURE — 84295 ASSAY OF SERUM SODIUM: CPT

## 2025-04-03 PROCEDURE — 36247 INS CATH ABD/L-EXT ART 3RD: CPT

## 2025-04-03 PROCEDURE — 80053 COMPREHEN METABOLIC PANEL: CPT

## 2025-04-03 PROCEDURE — 93005 ELECTROCARDIOGRAM TRACING: CPT

## 2025-04-03 PROCEDURE — 85018 HEMOGLOBIN: CPT

## 2025-04-03 PROCEDURE — 82947 ASSAY GLUCOSE BLOOD QUANT: CPT

## 2025-04-03 RX ORDER — IBUPROFEN 200 MG
1 TABLET ORAL
Qty: 0 | Refills: 0 | DISCHARGE

## 2025-04-03 RX ORDER — ACETAMINOPHEN 500 MG/5ML
2 LIQUID (ML) ORAL
Qty: 0 | Refills: 0 | DISCHARGE

## 2025-04-03 RX ADMIN — Medication 600 MILLIGRAM(S): at 10:15

## 2025-04-03 RX ADMIN — Medication 975 MILLIGRAM(S): at 12:25

## 2025-04-03 RX ADMIN — Medication 80 MILLIGRAM(S): at 10:14

## 2025-04-03 RX ADMIN — Medication 975 MILLIGRAM(S): at 11:25

## 2025-04-03 RX ADMIN — Medication 600 MILLIGRAM(S): at 11:00

## 2025-04-03 RX ADMIN — POLYETHYLENE GLYCOL 3350 17 GRAM(S): 17 POWDER, FOR SOLUTION ORAL at 11:25

## 2025-04-03 RX ADMIN — Medication 975 MILLIGRAM(S): at 00:00

## 2025-04-03 RX ADMIN — Medication 20 MILLIGRAM(S): at 11:25

## 2025-04-03 NOTE — DISCHARGE NOTE NURSING/CASE MANAGEMENT/SOCIAL WORK - NSDCVIVACCINE_GEN_ALL_CORE_FT
MMR; 11-Oct-2023 13:43; Nanette Gee (RN); [employer] [id number] (Exp. Date: 08-Sep-2024); SubCutaneous; Arm Left; 0.5 milliLiter(s); VIS (VIS Published: 20-Apr-2012, VIS Presented: 11-Oct-2023);

## 2025-04-03 NOTE — DISCHARGE NOTE NURSING/CASE MANAGEMENT/SOCIAL WORK - FINANCIAL ASSISTANCE
[facility] provides services at a reduced cost to those who are determined to be eligible through [facility]’s financial assistance program. Information regarding [facility]’s financial assistance program can be found by going to [email or web address] or by calling [phone].

## 2025-04-03 NOTE — PROGRESS NOTE ADULT - ASSESSMENT
A/P: 33 yo  at 6w0d by LMP 2/15 presenting for heavy vaginal bleeding.  On exam in ED, patient with normal vital signs and labs significant for downtrend in H/H 9.9/29.3->8.6/25.5. TVUS revealed concern for cervical ectopic pregnancy. Physical exam notable for a minimal bleeding from external cervical os. 1uPRBC transfused by ED and an additional unit on (3/31). Patient admitted for further monitoring for high suspicion for cervical ectopic now s/p MTX (3/31) and bilateral UAE (3/31).  Patient progressing well post-operatively and is hemodynamically stable.     Neuro: Pain well controlled. Will continue with Tylenol and start Motrin for pain control.   CV: Hemodynamically stable  -  Hct: 29.3->25.5->1upRBC(3/30)->27.3->>23.1->1upRBC->28.2->24.6->23.6->24.6->23.2 f/u AM cbc pt denies sxs of anemia.   Pulm: Saturating well on room air, encourage oob/amb, encourage use of incentive spirometry  GI: Reg diet   : Voiding spontaneously  - monitor pad counts  - IR: s/p bilateral UAE (3/31)  - s/p MTX (3/30)  - bHCG: 10,192(3/30)->MTX(3/31)->3947(3/31)->2161()    - f/u Day 4 bHCG  Heme: c/w ambulation and SCDs for DVT ppx  - s/p 1u cryo   FEN: SLIV  ID: Afebrile  Endo: No active issues   Dispo: continue routine post-op care, f/u AM cbc and repeat bHCG, monitor ecchymosis on right thigh for changes. Possible discharge planning.     Tara Danielle, PGY-1 A/P: 33 yo  at 6w0d by LMP 2/15 presenting for heavy vaginal bleeding.  On exam in ED, patient with normal vital signs and labs significant for downtrend in H/H 9.9/29.3->8.6/25.5. TVUS revealed concern for cervical ectopic pregnancy. Physical exam notable for a minimal bleeding from external cervical os. 1uPRBC transfused by ED and an additional unit on (3/31). Patient admitted for further monitoring for high suspicion for cervical ectopic now s/p MTX (3/31) and bilateral UAE (3/31).  Patient progressing well post-operatively and is hemodynamically stable.     Neuro: Pain well controlled. Will continue with Tylenol and start Motrin for pain control.   CV: Hemodynamically stable  -  Hct: 29.3->25.5->1upRBC(3/30)->27.3->>23.1->1upRBC->28.2->24.6->23.6->24.6->23.2 f/u AM cbc pt denies sxs of anemia.   Pulm: Saturating well on room air, encourage oob/amb, encourage use of incentive spirometry  GI: Reg diet   : Voiding spontaneously  - monitor pad counts  - IR: s/p bilateral UAE (3/31)  - s/p MTX (3/30)  - bHCG: 10,192(3/30)->MTX(3/31)->3947(3/31)->2161()    - f/u Day 4 bHCG  Heme: c/w ambulation and SCDs for DVT ppx  - s/p 1u cryo   FEN: SLIV  ID: Afebrile  Endo: No active issues   Dispo: continue routine post-op care, f/u AM cbc and repeat bHCG, monitor ecchymosis on right thigh for changes. Possible discharge planning.     Tara Danielle, PGY-1      pt seen and eval by me   doing well   bhcg decreased   has some co bleeding, light and also co mild cramping pain more on the L   abd soft, nt   precautions given   pt will go for bhcg on  to [facility] lab, rx given   fu in office on Monday  M. Oppenheim,MD

## 2025-04-03 NOTE — DISCHARGE NOTE NURSING/CASE MANAGEMENT/SOCIAL WORK - PATIENT PORTAL LINK FT
You can access the FollowMyHealth Patient Portal offered by [facility] by registering at the following website: [email or web address]. By joining [facility]’s FollowMyHealth portal, you will also be able to view your health information using other applications (apps) compatible with our system.

## 2025-04-03 NOTE — PROGRESS NOTE ADULT - SUBJECTIVE AND OBJECTIVE BOX
GYN Progress Note     Subjective:   Pt seen and examined at bedside. No events overnight. Some emesis after a heavy meal yesterday for lunch. Pain improved and well controlled. Patient ambulating. Passing flatus. Tolerating regular diet. Pt denies fever, chills, chest pain, SOB, nausea, vomiting, lightheadedness, dizziness.      Objective:  T(F): 98.4 (04-03-25 @ 05:19), Max: 99.1 (04-02-25 @ 16:30)  HR: 70 (04-03-25 @ 05:19) (70 - 94)  BP: 99/63 (04-03-25 @ 05:19) (99/63 - 115/80)  RR: 18 (04-03-25 @ 05:19) (18 - 18)  SpO2: 98% (04-03-25 @ 05:19) (96% - 100%)  Wt(kg): --  I&O's Summary    01 Apr 2025 07:01  -  02 Apr 2025 07:00  --------------------------------------------------------  IN: 0 mL / OUT: 200 mL / NET: -200 mL      CAPILLARY BLOOD GLUCOSE          MEDICATIONS  (STANDING):  famotidine    Tablet 20 milliGRAM(s) Oral daily  melatonin 5 milliGRAM(s) Oral at bedtime  polyethylene glycol 3350 17 Gram(s) Oral daily  senna 2 Tablet(s) Oral at bedtime    MEDICATIONS  (PRN):  acetaminophen     Tablet .. 975 milliGRAM(s) Oral every 6 hours PRN Moderate Pain (4 - 6)  ibuprofen  Tablet. 600 milliGRAM(s) Oral every 6 hours PRN Moderate Pain (4 - 6)  simethicone 80 milliGRAM(s) Chew every 8 hours PRN Gas      Physical Exam:  Constitutional: NAD, A+O x3  Lungs: breathing comfortably on RA  Abdomen: Soft, nondistended, no guarding or rebound tenderness. 3x2cm RLQ ecchymosis stable from yesterday   : Minimal bleeding on pad  Extremities: No lower extremity edema or calf tenderness bilaterally; 10x4 cm R upper anterior thigh ecchymosis, stable from yesterday with signs of healing        LABS:                        8.1    9.75  )-----------( 177      ( 02 Apr 2025 06:31 )             23.2               PT/INR - ( 01 Apr 2025 20:19 )   PT: 10.6 sec;   INR: 0.92 ratio         PTT - ( 01 Apr 2025 20:19 )  PTT:26.7 sec

## 2025-04-04 LAB — SURGICAL PATHOLOGY STUDY: SIGNIFICANT CHANGE UP

## 2025-07-16 NOTE — ED ADULT NURSE NOTE - NSSUHOSCREENINGYN_ED_ALL_ED
Bed: JNED-L  Expected date:   Expected time:   Means of arrival:   Comments:  ED#10   Yes - the patient is able to be screened